# Patient Record
Sex: MALE | Race: WHITE | NOT HISPANIC OR LATINO | Employment: OTHER | URBAN - METROPOLITAN AREA
[De-identification: names, ages, dates, MRNs, and addresses within clinical notes are randomized per-mention and may not be internally consistent; named-entity substitution may affect disease eponyms.]

---

## 2017-03-22 ENCOUNTER — TRANSFERRED RECORDS (OUTPATIENT)
Dept: HEALTH INFORMATION MANAGEMENT | Facility: CLINIC | Age: 57
End: 2017-03-22

## 2017-11-02 ENCOUNTER — MEDICAL CORRESPONDENCE (OUTPATIENT)
Dept: HEALTH INFORMATION MANAGEMENT | Facility: CLINIC | Age: 57
End: 2017-11-02

## 2017-11-16 ENCOUNTER — OFFICE VISIT (OUTPATIENT)
Dept: NEUROSURGERY | Facility: CLINIC | Age: 57
End: 2017-11-16
Payer: COMMERCIAL

## 2017-11-16 VITALS
OXYGEN SATURATION: 95 % | DIASTOLIC BLOOD PRESSURE: 88 MMHG | SYSTOLIC BLOOD PRESSURE: 154 MMHG | BODY MASS INDEX: 38.94 KG/M2 | WEIGHT: 272 LBS | HEART RATE: 71 BPM | TEMPERATURE: 97.5 F | HEIGHT: 70 IN

## 2017-11-16 DIAGNOSIS — M54.12 CERVICAL RADICULOPATHY: Primary | ICD-10-CM

## 2017-11-16 PROCEDURE — 99204 OFFICE O/P NEW MOD 45 MIN: CPT | Performed by: NURSE PRACTITIONER

## 2017-11-16 RX ORDER — HYDROCODONE BITARTRATE AND ACETAMINOPHEN 7.5; 325 MG/1; MG/1
1 TABLET ORAL 3 TIMES DAILY PRN
Status: ON HOLD | COMMUNITY
End: 2017-12-06

## 2017-11-16 RX ORDER — INSULIN GLARGINE 100 [IU]/ML
50 INJECTION, SOLUTION SUBCUTANEOUS 2 TIMES DAILY
COMMUNITY

## 2017-11-16 RX ORDER — CITALOPRAM HYDROBROMIDE 20 MG/10ML
20 SOLUTION, ORAL ORAL DAILY
COMMUNITY

## 2017-11-16 NOTE — LETTER
"    11/16/2017         RE: Tony S Aschoff  [address]  [place] [address]        Dear Colleague,    Thank you for referring your patient, Tony S Aschoff, to the [facility]. Please see a copy of my visit note below.    Dr. Boo Butterfield  [facility]  Neurosurgery Clinic Visit        CC: Neck pain    Primary care Provider: Clinic, [facility]      Reason For Visit:   I was asked by Karen Worthington to consult on the patient for cervical spinal stenosis.      HPI: Tony S Aschoff is a 57 year old male with posterior neck pain for the past 1+ years.  He states the pain has progressed and radiates into the arms bilaterally, \"Sometimes it feels worse on the left.\"  He describes shooting pain into the biceps, sometimes along the forearm.  He has constant N/T in the hands and fingers, most prominent in the first and second digits bilaterally.  He states he has weakness that has progressed as well.  He has increased pain with turning his head and with reaching, lifting and bending.  He has some relief with current pain medication, \"But not a lot.\"  He has a long history of low back pain and has had previous injections in his low back.  He does not recall injections for his neck pain.  He states he sometimes has difficulty with balance.  He actually slipped on the ice this past weekend and has had increased pain to his side, \"I think I fractured my ribs.\"  He denies changes to bowel or bladder.      Pain right now: 7    History reviewed. No pertinent past medical history.    Past Medical History reviewed with patient during visit.    History reviewed. No pertinent surgical history.  Past Surgical History reviewed with patient during visit.    No current outpatient prescriptions on file.     No current facility-administered medications for this visit.        Not on File    Social History     Social History     Marital status: Single     Spouse name: N/A     Number of " "children: N/A     Years of education: N/A     Social History Main Topics     Smoking status: Never Smoker     Smokeless tobacco: Never Used     Alcohol use None     Drug use: None     Sexual activity: Not Asked     Other Topics Concern     None     Social History Narrative     None       History reviewed. No pertinent family history.      ROS: 10 point ROS neg other than the symptoms noted above in the HPI.    Vital Signs: /88 (BP Location: Right arm, Cuff Size: Adult Large)  Pulse 71  Temp 97.5  F (36.4  C) (Oral)  Ht 5' 10\" (1.778 m)  Wt 272 lb (123.4 kg)  SpO2 95%  BMI 39.03 kg/m2    Examination:  Constitutional:  Alert, well nourished, NAD.  Memory: recent and remote memory intact  HEENT: Normocephalic, atraumatic.   Pulm:  Without shortness of breath   CV:  No pitting edema of BLE.    Neurological:  Awake  Alert  Oriented x 3  Speech clear  Cranial nerves II - XII intact    Motor exam   Shoulder Abduction:  Right:  5/5   Left:  5/5  Biceps:                      Right:  5/5   Left:  4/5  Triceps:                     Right:  5/5   Left:  5/5  Wrist Extensors:       Right:  5/5   Left:  5/5  Wrist Flexors:           Right:  5/5   Left:  5/5  Intrinsics:                   Right:  5/5   Left:  5/5  Hip Flexor:                Right: 5/5  Left:  5/5  Hip Adductor:             Right:  5/5  Left:  5/5  Hip Abductor:             Right:  5/5  Left:  5/5  Gastroc Soleus:        Right:  5/5  Left:  5/5  Tib/Ant:                      Right:  5/5  Left:  5/5  EHL:                          Right:  5/5  Left:  5/5   Sensation normal to bilateral upper and lower extremities  Clonus negative  DTRs 2+symmetric    Gait: Able to stand from a seated position. Normal non-antalgic, non-myelopathic gait.  Able to heel/toe walk without loss of balance  Cervical examination reveals limited range of motion due to pain.  Tenderness to palpation of the cervical spine and paraspinous muscles bilaterally.    Lumbar examination " "reveals  tenderness of the spine and paraspinous muscles.   Negative SI joint, sciatic notch or greater trochanteric tenderness to palpation bilaterally.  Straight leg raise is negative bilaterally.      Imaging: Cervical MRI 10/9/17:  -Severe central stenosis at C5-6 with likely myelomalacia  -Severe neural foraminal stenosis bilaterally at C5-6    Assessment/Plan:   Cervical DDD  Cervical Radiculopathy        Tony S Aschoff is a 57 year old male with posterior neck pain for the past 1+ years.  He states the pain has progressed and radiates into the arms bilaterally, \"Sometimes it feels worse on the left.\"  He describes shooting pain into the biceps, sometimes along the forearm.  He has constant N/T in the hands and fingers, most prominent in the first and second digits bilaterally.  He states he has weakness that has progressed as well.  He has increased pain with turning his head and with reaching, lifting and bending.  He has some relief with current pain medication, \"But not a lot.\"  He has a long history of low back pain and has had previous injections in his low back.  He does not recall injections for his neck pain.  He states he sometimes has difficulty with balance.  He actually slipped on the ice this past weekend and has had increased pain to his side, \"I think I fractured my ribs.\"  He denies changes to bowel or bladder.  We reviewed his MRI results today and discussed returning to see Dr. Butterfield to discuss possible surgical options.  He is interested in surgery.  He agrees that if he has any increased weakness and/or severe pain or changes to gait he will contact us sooner.      Patient Instructions   Schedule return with Dr. Butterfield to discuss surgical option.  Please contact the clinic if pain persists at [phone].      Abeba Presley  [facility]  [address]  [address] [place] [id number]    Tel [phone]  Pager [phone]      Again, " thank you for allowing me to participate in the care of your patient.        Sincerely,        Abeba Hamilton, NP

## 2017-11-16 NOTE — NURSING NOTE
Tony S Aschoff is a 57 year old male who presents for:  Chief Complaint   Patient presents with     Neurologic Problem     referral from LISA Worthington NP for cervical spinal stenosis        Initial Vitals:  There were no vitals taken for this visit. There is no height or weight on file to calculate BMI.. There is no height or weight on file to calculate BSA. BP completed using cuff size: large  Data Unavailable    Do you feel safe in your environment?  Yes  Do you need any refills today? No    Nursing Comments: referral from LISA Worthington NP for cervical spinal stenosis   You rate your pain today as 7 goes into both arm and hands with tingling and burning      Rhina Gunter, CMA,AAS

## 2017-11-16 NOTE — MR AVS SNAPSHOT
"              After Visit Summary   2017    Tony S Aschoff    MRN: [id number]           Patient Information     Date Of Birth          1960        Visit Information        Provider Department      2017 8:40 AM Abeba Hamilton NP [facility]        Care Instructions    Schedule return with Dr. Butterfield to discuss surgical option.  Please contact the clinic if pain persists at [phone].            Follow-ups after your visit        Who to contact     If you have questions or need follow up information about today's clinic visit or your schedule please contact [facility] directly at [phone].  Normal or non-critical lab and imaging results will be communicated to you by MyChart, letter or phone within 4 business days after the clinic has received the results. If you do not hear from us within 7 days, please contact the clinic through MyChart or phone. If you have a critical or abnormal lab result, we will notify you by phone as soon as possible.  Submit refill requests through [employer] or call your pharmacy and they will forward the refill request to us. Please allow 3 business days for your refill to be completed.          Additional Information About Your Visit        MyChart Information     [employer] lets you send messages to your doctor, view your test results, renew your prescriptions, schedule appointments and more. To sign up, go to [email or web address] . Click on \"Log in\" on the left side of the screen, which will take you to the Welcome page. Then click on \"Sign up Now\" on the right side of the page.     You will be asked to enter the access code listed below, as well as some personal information. Please follow the directions to create your username and password.     Your access code is: [email or web address]  Expires: 2018  9:18 AM     Your access code will  in 90 days. If you need help or a new code, please call your [facility] or [phone].   " "     Care EveryWhere ID     This is your Care EveryWhere ID. This could be used by other organizations to access your [place] medical records  [id number]        Your Vitals Were     Pulse Temperature Height Pulse Oximetry BMI (Body Mass Index)       71 97.5  F (36.4  C) (Oral) 5' 10\" (1.778 m) 95% 39.03 kg/m2        Blood Pressure from Last 3 Encounters:   11/16/17 154/88    Weight from Last 3 Encounters:   11/16/17 272 lb (123.4 kg)              Today, you had the following     No orders found for display       Primary Care Provider Office Phone # Fax #    [facility] [phone] [address] [facility] [address]        Equal Access to Services     MICHELET TAYLOR : Hadii marcus alcantarao Sorivera, waaxda luqadaha, qaybta kaalmada adeegyada, mary jane idiin hayzari rogers . So [facility] [phone].    ATENCIÓN: Si habla español, tiene a fernández disposición servicios gratuitos de asistencia lingüística. Llame al [phone].    We comply with applicable federal civil rights laws and [place] laws. We do not discriminate on the basis of race, color, national origin, age, disability, sex, sexual orientation, or gender identity.            Thank you!     Thank you for choosing [facility]  for your care. Our goal is always to provide you with excellent care. Hearing back from our patients is one way we can continue to improve our services. Please take a few minutes to complete the written survey that you may receive in the mail after your visit with us. Thank you!             Your Updated Medication List - Protect others around you: Learn how to safely use, store and throw away your medicines at www.disposemymeds.org.      Notice  As of 11/16/2017  9:18 AM    You have not been prescribed any medications.      "

## 2017-11-16 NOTE — PROGRESS NOTES
"Dr. Boo Butterfield  [facility]  Neurosurgery Clinic Visit        CC: Neck pain    Primary care Provider: [facility]      Reason For Visit:   I was asked by Karen Worthington to consult on the patient for cervical spinal stenosis.      HPI: Tony S Aschoff is a 57 year old male with posterior neck pain for the past 1+ years.  He states the pain has progressed and radiates into the arms bilaterally, \"Sometimes it feels worse on the left.\"  He describes shooting pain into the biceps, sometimes along the forearm.  He has constant N/T in the hands and fingers, most prominent in the first and second digits bilaterally.  He states he has weakness that has progressed as well.  He has increased pain with turning his head and with reaching, lifting and bending.  He has some relief with current pain medication, \"But not a lot.\"  He has a long history of low back pain and has had previous injections in his low back.  He does not recall injections for his neck pain.  He states he sometimes has difficulty with balance.  He actually slipped on the ice this past weekend and has had increased pain to his side, \"I think I fractured my ribs.\"  He denies changes to bowel or bladder.      Pain right now: 7    History reviewed. No pertinent past medical history.    Past Medical History reviewed with patient during visit.    History reviewed. No pertinent surgical history.  Past Surgical History reviewed with patient during visit.    No current outpatient prescriptions on file.     No current facility-administered medications for this visit.        Not on File    Social History     Social History     Marital status: Single     Spouse name: N/A     Number of children: N/A     Years of education: N/A     Social History Main Topics     Smoking status: Never Smoker     Smokeless tobacco: Never Used     Alcohol use None     Drug use: None     Sexual activity: Not Asked     Other Topics Concern     None " "    Social History Narrative     None       History reviewed. No pertinent family history.      ROS: 10 point ROS neg other than the symptoms noted above in the HPI.    Vital Signs: /88 (BP Location: Right arm, Cuff Size: Adult Large)  Pulse 71  Temp 97.5  F (36.4  C) (Oral)  Ht 5' 10\" (1.778 m)  Wt 272 lb (123.4 kg)  SpO2 95%  BMI 39.03 kg/m2    Examination:  Constitutional:  Alert, well nourished, NAD.  Memory: recent and remote memory intact  HEENT: Normocephalic, atraumatic.   Pulm:  Without shortness of breath   CV:  No pitting edema of BLE.    Neurological:  Awake  Alert  Oriented x 3  Speech clear  Cranial nerves II - XII intact    Motor exam   Shoulder Abduction:  Right:  5/5   Left:  5/5  Biceps:                      Right:  5/5   Left:  4/5  Triceps:                     Right:  5/5   Left:  5/5  Wrist Extensors:       Right:  5/5   Left:  5/5  Wrist Flexors:           Right:  5/5   Left:  5/5  Intrinsics:                   Right:  5/5   Left:  5/5  Hip Flexor:                Right: 5/5  Left:  5/5  Hip Adductor:             Right:  5/5  Left:  5/5  Hip Abductor:             Right:  5/5  Left:  5/5  Gastroc Soleus:        Right:  5/5  Left:  5/5  Tib/Ant:                      Right:  5/5  Left:  5/5  EHL:                          Right:  5/5  Left:  5/5   Sensation normal to bilateral upper and lower extremities  Clonus negative  DTRs 2+symmetric    Gait: Able to stand from a seated position. Normal non-antalgic, non-myelopathic gait.  Able to heel/toe walk without loss of balance  Cervical examination reveals limited range of motion due to pain.  Tenderness to palpation of the cervical spine and paraspinous muscles bilaterally.    Lumbar examination reveals  tenderness of the spine and paraspinous muscles.   Negative SI joint, sciatic notch or greater trochanteric tenderness to palpation bilaterally.  Straight leg raise is negative bilaterally.      Imaging: Cervical MRI 10/9/17:  -Severe " "central stenosis at C5-6 with likely myelomalacia  -Severe neural foraminal stenosis bilaterally at C5-6    Assessment/Plan:   Cervical DDD  Cervical Radiculopathy        Tony S Aschoff is a 57 year old male with posterior neck pain for the past 1+ years.  He states the pain has progressed and radiates into the arms bilaterally, \"Sometimes it feels worse on the left.\"  He describes shooting pain into the biceps, sometimes along the forearm.  He has constant N/T in the hands and fingers, most prominent in the first and second digits bilaterally.  He states he has weakness that has progressed as well.  He has increased pain with turning his head and with reaching, lifting and bending.  He has some relief with current pain medication, \"But not a lot.\"  He has a long history of low back pain and has had previous injections in his low back.  He does not recall injections for his neck pain.  He states he sometimes has difficulty with balance.  He actually slipped on the ice this past weekend and has had increased pain to his side, \"I think I fractured my ribs.\"  He denies changes to bowel or bladder.  We reviewed his MRI results today and discussed returning to see Dr. Butterfield to discuss possible surgical options.  He is interested in surgery.  He agrees that if he has any increased weakness and/or severe pain or changes to gait he will contact us sooner.      Patient Instructions   Schedule return with Dr. Butterfield to discuss surgical option.  Please contact the clinic if pain persists at [phone].      Abeba Prelsey  [facility]  [address] [address]    Tel [phone]  Pager [phone]    "

## 2017-11-28 ENCOUNTER — OFFICE VISIT (OUTPATIENT)
Dept: NEUROSURGERY | Facility: CLINIC | Age: 57
End: 2017-11-28
Payer: COMMERCIAL

## 2017-11-28 VITALS
SYSTOLIC BLOOD PRESSURE: 141 MMHG | BODY MASS INDEX: 38.91 KG/M2 | HEART RATE: 76 BPM | DIASTOLIC BLOOD PRESSURE: 81 MMHG | RESPIRATION RATE: 16 BRPM | HEIGHT: 70 IN | TEMPERATURE: 97.1 F | WEIGHT: 271.8 LBS | OXYGEN SATURATION: 96 %

## 2017-11-28 DIAGNOSIS — M48.02 SPINAL STENOSIS IN CERVICAL REGION: Primary | ICD-10-CM

## 2017-11-28 DIAGNOSIS — M47.12 CERVICAL SPONDYLOSIS WITH MYELOPATHY: ICD-10-CM

## 2017-11-28 PROCEDURE — 99213 OFFICE O/P EST LOW 20 MIN: CPT | Performed by: NEUROLOGICAL SURGERY

## 2017-11-28 ASSESSMENT — PAIN SCALES - GENERAL: PAINLEVEL: EXTREME PAIN (8)

## 2017-11-28 NOTE — NURSING NOTE
"Tony S Aschoff is a 57 year old male who presents for:  Chief Complaint   Patient presents with     Neck Pain     Neck pain radiating into bilateral arm. Onset: about a year, but 2.5 weeks ago patient states he fell on a frozen step which caused worse pain in his shoulders and neck. He will have N/T in bilateral arm. Left arm is worse due to his fall. He is here to discuss surgery with Dr. Butterfield.         Initial Vitals:  /81  Pulse 76  Temp 97.1  F (36.2  C) (Oral)  Resp 16  Ht 5' 10\" (1.778 m)  Wt 271 lb 12.8 oz (123.3 kg)  SpO2 96%  BMI 39 kg/m2 Estimated body mass index is 39 kg/(m^2) as calculated from the following:    Height as of this encounter: 5' 10\" (1.778 m).    Weight as of this encounter: 271 lb 12.8 oz (123.3 kg).. Body surface area is 2.47 meters squared. BP completed using cuff size: large  Extreme Pain (8)    Do you feel safe in your environment?  Yes  Do you need any refills today? No    Nursing Comments: Neck pain radiating into bilateral arm. Onset: about a year, but 2.5 weeks ago patient states he fell on a frozen step which caused worse pain in his shoulders and neck. He will have N/T in bilateral arm. Left arm is worse due to his fall. He is here to discuss surgery with Dr. Butterfield.         Ashley Stuart    "

## 2017-11-28 NOTE — MR AVS SNAPSHOT
After Visit Summary   11/28/2017    Tony S Aschoff    MRN: [id number]           Patient Information     Date Of Birth          1960        Visit Information        Provider Department      11/28/2017 1:40 PM Boo Butterfield MD [facility] Instructions    Patient Instructions  Pre-Operative:  -Surgery scheduled at [facility] for C5-6 ACDF (anterior cervical discectomy and fusion)  -Surgical risks: blood clots in the leg or lung, problems urinating, nerve damage, drainage from the incision, infection,stiffness  - Pre-operative physical with primary care physician within 30 days of surgical date.   -1 night hospitalization.    -Shower procedure: please shower with antimicrobial soap the night before surgery and morning of surgery. Please refer to showering instruction sheet in folder.  -Stop all solid foods 8 hours before surgery.  -Keep drinking clear liquids until 4 hours before surgery. Clear liquids include water, clear juice, black coffee, or clear tea without milk, Gatorade, clear soda.   - Discontinue Aspirin, NSAIDs (Advil/Ibuprofen, Naproxen,Nuprin,Relafen/Nabumetone, Diclofenac,Meloxicam, Aleve, Celebrex) x 7 days prior to surgical date.  - May try tylenol for pain 1000 mg three times per day for pain      Post-Operative:  -Do not begin taking Non-Steroidal Anti-Inflammatory Drugs or NSAIDs (Advil/ibuprofen, Naproxen,Nuprin, Relafen/Nabumetone, Diclofenac,Meloxicam, Aleve, Celebrex, Aspirin, etc.) until 12 weeks after surgery. May cause bleeding and interfere with bone healing.   - Post operative incisional pain x 1-2 weeks which will require pain medications and muscle relaxants. You will receive medication upon discharge.  -Do NOT drive while taking narcotic pain medication.  -Do not drink alcohol while using any pain medication   -Post operative incision care- Watch for signs of infection: redness, swelling, warmth, drainage, and fever of  "101 degrees or higher. Notify clinic [phone].  -No submerging incision in water such as pools, hot tubs,baths for at least 8 weeks or until incision is healed. Showers are fine.   - Post operative activity limitations: 6-8 weeks, no lifting > 10 pounds, no bending, twisting, or overhead reaching.  -Orthotics will fit you for a brace in the hospital.Cervical fusion: wear soft collar for up to 2 weeks as needed for comfort.  - Follow up appointments: 6 week post op follow up visit with Nurse practitioner and x-ray prior to appointment.Please call to schedule follow up appointment at [phone].  -If you are currently employed, you will need to be off work for 4-6 weeks for post op recovery and healing.                                 Follow-ups after your visit        Who to contact     If you have questions or need follow up information about today's clinic visit or your schedule please contact [facility] directly at [phone].  Normal or non-critical lab and imaging results will be communicated to you by Tynthart, letter or phone within 4 business days after the clinic has received the results. If you do not hear from us within 7 days, please contact the clinic through Frequent Browsert or phone. If you have a critical or abnormal lab result, we will notify you by phone as soon as possible.  Submit refill requests through Risktail or call your pharmacy and they will forward the refill request to us. Please allow 3 business days for your refill to be completed.          Additional Information About Your Visit        Risktail Information     Risktail lets you send messages to your doctor, view your test results, renew your prescriptions, schedule appointments and more. To sign up, go to [email or web address] . Click on \"Log in\" on the left side of the screen, which will take you to the Welcome page. Then click on \"Sign up Now\" on the right side of the page.     You will be asked to enter the access code " "listed below, as well as some personal information. Please follow the directions to create your username and password.     Your access code is: [email or web address]  Expires: 2018  9:18 AM     Your access code will  in 90 days. If you need help or a new code, please call your [facility] clinic or [phone].        Care EveryWhere ID     This is your Care EveryWhere ID. This could be used by other organizations to access your [facility] medical records  [id number]        Your Vitals Were     Pulse Temperature Respirations Height Pulse Oximetry BMI (Body Mass Index)    76 97.1  F (36.2  C) (Oral) 16 5' 10\" (1.778 m) 96% 39 kg/m2       Blood Pressure from Last 3 Encounters:   17 141/81   17 154/88    Weight from Last 3 Encounters:   17 271 lb 12.8 oz (123.3 kg)   17 272 lb (123.4 kg)              Today, you had the following     No orders found for display       Primary Care Provider Office Phone # Fax #    [facility] [phone]       [address] [facility] [address]        Equal Access to Services     MICHELET TAYLOR : Hadii marcus ku hadasho Soomaali, waaxda luqadaha, qaybta kaalmada adeegyada, waxay jovan hayzari smith. So [facility] [phone].    ATENCIÓN: Si habla español, tiene a fernández disposición servicios gratuitos de asistencia lingüística. Llame al [phone].    We comply with applicable federal civil rights laws and [place] laws. We do not discriminate on the basis of race, color, national origin, age, disability, sex, sexual orientation, or gender identity.            Thank you!     Thank you for choosing [facility]  for your care. Our goal is always to provide you with excellent care. Hearing back from our patients is one way we can continue to improve our services. Please take a few minutes to complete the written survey that you may receive in the mail after your visit with us. Thank you!             Your " Updated Medication List - Protect others around you: Learn how to safely use, store and throw away your medicines at www.disposemymeds.org.          This list is accurate as of: 11/28/17  2:40 PM.  Always use your most recent med list.                   Brand Name Dispense Instructions for use Diagnosis    ATORVASTATIN CALCIUM PO      Take 80 mg by mouth        BACLOFEN PO      Take 10 mg by mouth 3 times daily        BASAGLAR 100 UNIT/ML injection      Inject 50 Units Subcutaneous 2 times daily        citalopram 10 MG/5ML solution    celeXA     Take 20 mg by mouth daily        dulaglutide 1.5 MG/0.5ML pen    TRULICITY     Inject 1.5 mg Subcutaneous        HYDROcodone-acetaminophen 7.5-325 MG per tablet    NORCO     Take 1 tablet by mouth 3 times daily as needed for moderate to severe pain        LISINOPRIL PO      Take 10 mg by mouth daily        METFORMIN HCL PO      Take 500 mg by mouth 2 times daily (with meals) Take 2 tablets twice daily        NovoLOG FLEXPEN 100 UNIT/ML injection   Generic drug:  insulin aspart      Inject 30 Units Subcutaneous 3 times daily (with meals)

## 2017-11-28 NOTE — PATIENT INSTRUCTIONS
Patient Instructions  Pre-Operative:  -Surgery scheduled at [facility] for C5-6 ACDF (anterior cervical discectomy and fusion)  -Surgical risks: blood clots in the leg or lung, problems urinating, nerve damage, drainage from the incision, infection,stiffness  - Pre-operative physical with primary care physician within 30 days of surgical date.   -1 night hospitalization.    -Shower procedure: please shower with antimicrobial soap the night before surgery and morning of surgery. Please refer to showering instruction sheet in folder.  -Stop all solid foods 8 hours before surgery.  -Keep drinking clear liquids until 4 hours before surgery. Clear liquids include water, clear juice, black coffee, or clear tea without milk, Gatorade, clear soda.   - Discontinue Aspirin, NSAIDs (Advil/Ibuprofen, Naproxen,Nuprin,Relafen/Nabumetone, Diclofenac,Meloxicam, Aleve, Celebrex) x 7 days prior to surgical date.  - May try tylenol for pain 1000 mg three times per day for pain      Post-Operative:  -Do not begin taking Non-Steroidal Anti-Inflammatory Drugs or NSAIDs (Advil/ibuprofen, Naproxen,Nuprin, Relafen/Nabumetone, Diclofenac,Meloxicam, Aleve, Celebrex, Aspirin, etc.) until 12 weeks after surgery. May cause bleeding and interfere with bone healing.   - Post operative incisional pain x 1-2 weeks which will require pain medications and muscle relaxants. You will receive medication upon discharge.  -Do NOT drive while taking narcotic pain medication.  -Do not drink alcohol while using any pain medication   -Post operative incision care- Watch for signs of infection: redness, swelling, warmth, drainage, and fever of 101 degrees or higher. Notify clinic [phone].  -No submerging incision in water such as pools, hot tubs,baths for at least 8 weeks or until incision is healed. Showers are fine.   - Post operative activity limitations: 6-8 weeks, no lifting > 10 pounds, no bending, twisting, or overhead  reaching.  -Orthotics will fit you for a brace in the hospital.Cervical fusion: wear soft collar for up to 2 weeks as needed for comfort.  - Follow up appointments: 6 week post op follow up visit with Nurse practitioner and x-ray prior to appointment.Please call to schedule follow up appointment at [phone].  -If you are currently employed, you will need to be off work for 4-6 weeks for post op recovery and healing.

## 2017-11-28 NOTE — LETTER
11/28/2017         RE: Tony S Aschoff  [address]  [place] [address]        Dear Colleague,    Thank you for referring your patient, Tony S Aschoff, to the [facility]. Please see a copy of my visit note below.    Neurosurgery Follow Up Clinic Visit      Tony S Aschoff returns for follow up visit regarding his BUE numbness and tingling and clumsy legs    Currently the patient describes having persistent and worsening BUE numbness and tingling and clumsy legs. He has normal bowel and bladder function. He feels that his hands are getting worse and his legs are more clumsy. He recently fell due to these symptoms.    Exam is stable    MRI cervical spine - C5-6 severe stenosis with spinal cord compression and myelomalacia     Assessment: 57 year old male with C5-6 severe stenosis with myelomalacia and myelopathy       Plan:   I have recommended an urgent C5-6 ACDF. I discussed with the patient the risk of surgery to include, but, not be limited to; dysphagia, hoarseness, paralyzed vocal cord, nerve/spinal cord injury, pseudoarthrosis, failure of hardware, failure of improvement of symptoms, CSF leak,  infection, post op hematoma, the need for recurrent surgery, paralysis, coma and death.            Again, thank you for allowing me to participate in the care of your patient.        Sincerely,        Boo Butterfield MD

## 2017-11-29 NOTE — NURSING NOTE
Pre-operative Education    Education included but not limited to:  - Pre-operative physical with primary care physician within 30 days of surgical date. Pre op [place], [facility].   - Pre-operative clearance (cardiology, hematology, etc).   - Discontinue Aspirin, NSAIDs, Diclofenac x 7 days prior to surgical date.   -May try tylenol for pain 1000 mg three times per day for pain  -Do not begin taking Non-Steroidal Anti-Inflammatory Drugs or NSAIDs (Advil,Motrin, Ibuprofen,Nuprin, Diclofenac,Meloxicam, Aleve, Celebrex, Aspirin, etc.) until 12 weeks after surgery if you had a fusion. May cause bleeding and interfere with bone healing.  -Patient is not a smoker  -Forms to be completed: 6 weeks fmla if needed   -Surgical risks: blood clots in the leg or lung, problems urinating, nerve damage, drainage from the incision, infection,stiffness  -Preparation timeline  - When to start NPO           -Special bathing procedure.Patient received Hibiclens and showering instruction sheet.   Hospital stay:  Checking in  The surgery itself   Recovery room  - Transition to the hospital room where you will begin your recovery  - Managing pain   -  1 night hospitalization  - Post operative incisional pain x 1-2 weeks which will require pain medications and muscle relaxants.   -Do NOT drive while taking narcotic pain medication.  -Post operative incision care-Keep your incision clean and dry at all times. OK to remove dressing on postop day 2. OK to shower on postop day 3 and allow water to run over incision, pat dry after shower. No bathing, swimming or submerging in water. Call immediately or come to ED if any drainage occurs, or you develop new pain. Watch for signs of infection: redness, swelling, warmth, drainage, and fever of 101 degrees or higher. Notify clinic.   - Post operative activity limitations: 6-8 weeks, no lifting > 10 pounds, no bending, twisting, or overhead reaching.  -Orthotics will fit you for a brace in  the hospital.Cervical fusion: wear soft collar for up to 2 weeks as needed for comfort.  - Follow up appointments in 6 weeks with Nurse Practitioner with X-ray prior. Please call to schedule follow up appointment at [phone].   - Spine Education book was also given to the patient for further review.      Patient verbalized understanding of above instructions. All questions were answered to the best of my ability and the patient's satisfaction. Patient advised to call with any additional questions or concerns.  Diana Hobbs RN

## 2017-11-29 NOTE — PROGRESS NOTES
ROGE     D: Call was received from Jesusita in pre-admitting noting of pt's upcoming surgery as the concerns that he shared regarding finances and food.      I: SW spoke by phone with pt who informs that he has been struggling to make ends meet, and has had difficulties making some of his expenses noting that he is two months behind on his car payments. He notes that he does use the food shelves in his area, had food stamps in the past but not currently. SW suggested that he make application to the SNAP (food stamp) program, he indicated that he was unsure if he could get through that process. SW noticed that he has a brother, living in the area, inquired if he could help with the on line application for the SNAP program.. he felt that his brother was too busy, but would check with him. During the course of conversation SW discussed with him the opportunity for case management/care coordination for him for navagating the available systems, as well as monitoring his needs and health issues which pt indicated would be beneficial. SW has left message with primary care RN clinic coordinator,  Marielena Tong ( [phone]) regarding requesting support from her or SW in the clinic if available for pt.         With continued discussion with pt regarding Marielena's availability pt informed SW that he knows Marielena and saw her in follow-up at clinic to work on getting his A1C down. It was suggested that he contact Marielena as soon as possible for the support and clinic management as he noted that he did not share with Marielena the extent of the financial struggles he is experiencing which would be crucial as they together look at pt's dietary needs, access to appropriate foods, etc. SW also has spoken to pt about the opportunities that the community he lives in for the free meal services, he was not interested in pursuing these at this time.     A/P: SW available post surgery for discharge planning as needed.

## 2017-11-29 NOTE — OR NURSING
ML for social work dept re: pt stated during pre op call that he has worries about his financial situation during 6 week recovery as he will not have an income so no access to money or food.

## 2017-11-29 NOTE — PROGRESS NOTES
Neurosurgery Follow Up Clinic Visit      Tony S Aschoff returns for follow up visit regarding his BUE numbness and tingling and clumsy legs    Currently the patient describes having persistent and worsening BUE numbness and tingling and clumsy legs. He has normal bowel and bladder function. He feels that his hands are getting worse and his legs are more clumsy. He recently fell due to these symptoms.    Exam is stable    MRI cervical spine - C5-6 severe stenosis with spinal cord compression and myelomalacia     Assessment: 57 year old male with C5-6 severe stenosis with myelomalacia and myelopathy       Plan:   I have recommended an urgent C5-6 ACDF. I discussed with the patient the risk of surgery to include, but, not be limited to; dysphagia, hoarseness, paralyzed vocal cord, nerve/spinal cord injury, pseudoarthrosis, failure of hardware, failure of improvement of symptoms, CSF leak,  infection, post op hematoma, the need for recurrent surgery, paralysis, coma and death.

## 2017-12-05 ENCOUNTER — ANESTHESIA (OUTPATIENT)
Dept: SURGERY | Facility: CLINIC | Age: 57
DRG: 472 | End: 2017-12-05
Payer: COMMERCIAL

## 2017-12-05 ENCOUNTER — APPOINTMENT (OUTPATIENT)
Dept: GENERAL RADIOLOGY | Facility: CLINIC | Age: 57
DRG: 472 | End: 2017-12-05
Attending: NEUROLOGICAL SURGERY
Payer: COMMERCIAL

## 2017-12-05 ENCOUNTER — ANESTHESIA EVENT (OUTPATIENT)
Dept: SURGERY | Facility: CLINIC | Age: 57
DRG: 472 | End: 2017-12-05
Payer: COMMERCIAL

## 2017-12-05 ENCOUNTER — HOSPITAL ENCOUNTER (INPATIENT)
Facility: CLINIC | Age: 57
LOS: 1 days | Discharge: HOME | DRG: 472 | End: 2017-12-06
Attending: NEUROLOGICAL SURGERY | Admitting: NEUROLOGICAL SURGERY
Payer: COMMERCIAL

## 2017-12-05 DIAGNOSIS — Z98.1 S/P CERVICAL SPINAL FUSION: Primary | ICD-10-CM

## 2017-12-05 LAB
ABO + RH BLD: NORMAL
ABO + RH BLD: NORMAL
BLD GP AB SCN SERPL QL: NORMAL
BLOOD BANK CMNT PATIENT-IMP: NORMAL
GLUCOSE BLDC GLUCOMTR-MCNC: 164 MG/DL (ref 70–99)
GLUCOSE BLDC GLUCOMTR-MCNC: 172 MG/DL (ref 70–99)
GLUCOSE BLDC GLUCOMTR-MCNC: 211 MG/DL (ref 70–99)
GLUCOSE BLDC GLUCOMTR-MCNC: 232 MG/DL (ref 70–99)
GLUCOSE BLDC GLUCOMTR-MCNC: 258 MG/DL (ref 70–99)
HGB BLD-MCNC: 15.7 G/DL (ref 13.3–17.7)
SPECIMEN EXP DATE BLD: NORMAL

## 2017-12-05 PROCEDURE — 27210794 ZZH OR GENERAL SUPPLY STERILE: Performed by: NEUROLOGICAL SURGERY

## 2017-12-05 PROCEDURE — 25000132 ZZH RX MED GY IP 250 OP 250 PS 637: Performed by: NEUROLOGICAL SURGERY

## 2017-12-05 PROCEDURE — 71000013 ZZH RECOVERY PHASE 1 LEVEL 1 EA ADDTL HR: Performed by: NEUROLOGICAL SURGERY

## 2017-12-05 PROCEDURE — 25800025 ZZH RX 258: Performed by: NEUROLOGICAL SURGERY

## 2017-12-05 PROCEDURE — 25000566 ZZH SEVOFLURANE, EA 15 MIN: Performed by: NEUROLOGICAL SURGERY

## 2017-12-05 PROCEDURE — 40000277 XR SURGERY CARM FLUORO LESS THAN 5 MIN W STILLS: Mod: TC

## 2017-12-05 PROCEDURE — 36000071 ZZH SURGERY LEVEL 5 W FLUORO 1ST 30 MIN: Performed by: NEUROLOGICAL SURGERY

## 2017-12-05 PROCEDURE — 00000146 ZZHCL STATISTIC GLUCOSE BY METER IP

## 2017-12-05 PROCEDURE — 25000128 H RX IP 250 OP 636: Performed by: NEUROLOGICAL SURGERY

## 2017-12-05 PROCEDURE — 86900 BLOOD TYPING SEROLOGIC ABO: CPT | Performed by: ANESTHESIOLOGY

## 2017-12-05 PROCEDURE — 25000128 H RX IP 250 OP 636: Performed by: NURSE ANESTHETIST, CERTIFIED REGISTERED

## 2017-12-05 PROCEDURE — 40000306 ZZH STATISTIC PRE PROC ASSESS II: Performed by: NEUROLOGICAL SURGERY

## 2017-12-05 PROCEDURE — 95940 IONM IN OPERATNG ROOM 15 MIN: CPT | Mod: XU | Performed by: NEUROLOGICAL SURGERY

## 2017-12-05 PROCEDURE — C1713 ANCHOR/SCREW BN/BN,TIS/BN: HCPCS | Performed by: NEUROLOGICAL SURGERY

## 2017-12-05 PROCEDURE — 00NW0ZZ RELEASE CERVICAL SPINAL CORD, OPEN APPROACH: ICD-10-PCS | Performed by: NEUROLOGICAL SURGERY

## 2017-12-05 PROCEDURE — 37000009 ZZH ANESTHESIA TECHNICAL FEE, EACH ADDTL 15 MIN: Performed by: NEUROLOGICAL SURGERY

## 2017-12-05 PROCEDURE — 22853 INSJ BIOMECHANICAL DEVICE: CPT | Performed by: NEUROLOGICAL SURGERY

## 2017-12-05 PROCEDURE — 86850 RBC ANTIBODY SCREEN: CPT | Performed by: ANESTHESIOLOGY

## 2017-12-05 PROCEDURE — 36000069 ZZH SURGERY LEVEL 5 EA 15 ADDTL MIN: Performed by: NEUROLOGICAL SURGERY

## 2017-12-05 PROCEDURE — 25000125 ZZHC RX 250: Performed by: NURSE ANESTHETIST, CERTIFIED REGISTERED

## 2017-12-05 PROCEDURE — 25000128 H RX IP 250 OP 636: Performed by: ANESTHESIOLOGY

## 2017-12-05 PROCEDURE — 0RG10A0 FUSION OF CERVICAL VERTEBRAL JOINT WITH INTERBODY FUSION DEVICE, ANTERIOR APPROACH, ANTERIOR COLUMN, OPEN APPROACH: ICD-10-PCS | Performed by: NEUROLOGICAL SURGERY

## 2017-12-05 PROCEDURE — 86901 BLOOD TYPING SEROLOGIC RH(D): CPT | Performed by: ANESTHESIOLOGY

## 2017-12-05 PROCEDURE — 85018 HEMOGLOBIN: CPT | Performed by: ANESTHESIOLOGY

## 2017-12-05 PROCEDURE — 25000132 ZZH RX MED GY IP 250 OP 250 PS 637: Performed by: INTERNAL MEDICINE

## 2017-12-05 PROCEDURE — 4A11X4G MONITORING OF PERIPHERAL NERVOUS ELECTRICAL ACTIVITY, INTRAOPERATIVE, EXTERNAL APPROACH: ICD-10-PCS | Performed by: NEUROLOGICAL SURGERY

## 2017-12-05 PROCEDURE — 25000300 ZZH OR RX SURGIFLO HEMOSTATIC MATRIX 10ML 199102S OPNP: Performed by: NEUROLOGICAL SURGERY

## 2017-12-05 PROCEDURE — 01N10ZZ RELEASE CERVICAL NERVE, OPEN APPROACH: ICD-10-PCS | Performed by: NEUROLOGICAL SURGERY

## 2017-12-05 PROCEDURE — 93010 ELECTROCARDIOGRAM REPORT: CPT | Performed by: INTERNAL MEDICINE

## 2017-12-05 PROCEDURE — 0RB30ZZ EXCISION OF CERVICAL VERTEBRAL DISC, OPEN APPROACH: ICD-10-PCS | Performed by: NEUROLOGICAL SURGERY

## 2017-12-05 PROCEDURE — 27210995 ZZH RX 272: Performed by: NEUROLOGICAL SURGERY

## 2017-12-05 PROCEDURE — 36415 COLL VENOUS BLD VENIPUNCTURE: CPT | Performed by: ANESTHESIOLOGY

## 2017-12-05 PROCEDURE — 99207 ZZC CONSULT E&M CHANGED TO INITIAL LEVEL: CPT | Performed by: INTERNAL MEDICINE

## 2017-12-05 PROCEDURE — 22551 ARTHRD ANT NTRBDY CERVICAL: CPT | Performed by: NEUROLOGICAL SURGERY

## 2017-12-05 PROCEDURE — 71000012 ZZH RECOVERY PHASE 1 LEVEL 1 FIRST HR: Performed by: NEUROLOGICAL SURGERY

## 2017-12-05 PROCEDURE — 92200047 ZZHC NEURO MONITORING SERVICE, UP TO 7 HOURS (T1FEE): Performed by: NEUROLOGICAL SURGERY

## 2017-12-05 PROCEDURE — 99222 1ST HOSP IP/OBS MODERATE 55: CPT | Performed by: INTERNAL MEDICINE

## 2017-12-05 PROCEDURE — 12000007 ZZH R&B INTERMEDIATE

## 2017-12-05 PROCEDURE — 25000131 ZZH RX MED GY IP 250 OP 636 PS 637: Performed by: INTERNAL MEDICINE

## 2017-12-05 PROCEDURE — 27810325 ZZHC OR IMPLANT OTHER OPNP: Performed by: NEUROLOGICAL SURGERY

## 2017-12-05 PROCEDURE — 22842 INSERT SPINE FIXATION DEVICE: CPT | Mod: 59 | Performed by: NEUROLOGICAL SURGERY

## 2017-12-05 PROCEDURE — 37000008 ZZH ANESTHESIA TECHNICAL FEE, 1ST 30 MIN: Performed by: NEUROLOGICAL SURGERY

## 2017-12-05 PROCEDURE — 25000125 ZZHC RX 250: Performed by: NEUROLOGICAL SURGERY

## 2017-12-05 DEVICE — IMPLANTABLE DEVICE: Type: IMPLANTABLE DEVICE | Site: SPINE CERVICAL | Status: FUNCTIONAL

## 2017-12-05 RX ORDER — ACETAMINOPHEN 325 MG/1
650 TABLET ORAL EVERY 4 HOURS PRN
Status: DISCONTINUED | OUTPATIENT
Start: 2017-12-08 | End: 2017-12-06 | Stop reason: HOSPADM

## 2017-12-05 RX ORDER — LIDOCAINE 40 MG/G
CREAM TOPICAL
Status: DISCONTINUED | OUTPATIENT
Start: 2017-12-05 | End: 2017-12-05 | Stop reason: HOSPADM

## 2017-12-05 RX ORDER — DIMENHYDRINATE 50 MG/ML
25 INJECTION, SOLUTION INTRAMUSCULAR; INTRAVENOUS
Status: DISCONTINUED | OUTPATIENT
Start: 2017-12-05 | End: 2017-12-05 | Stop reason: HOSPADM

## 2017-12-05 RX ORDER — ACETAMINOPHEN 325 MG/1
975 TABLET ORAL EVERY 8 HOURS
Status: DISCONTINUED | OUTPATIENT
Start: 2017-12-05 | End: 2017-12-05

## 2017-12-05 RX ORDER — ONDANSETRON 2 MG/ML
INJECTION INTRAMUSCULAR; INTRAVENOUS PRN
Status: DISCONTINUED | OUTPATIENT
Start: 2017-12-05 | End: 2017-12-05

## 2017-12-05 RX ORDER — ONDANSETRON 4 MG/1
4 TABLET, ORALLY DISINTEGRATING ORAL EVERY 30 MIN PRN
Status: DISCONTINUED | OUTPATIENT
Start: 2017-12-05 | End: 2017-12-05 | Stop reason: HOSPADM

## 2017-12-05 RX ORDER — ACETAMINOPHEN 325 MG/1
650 TABLET ORAL EVERY 4 HOURS PRN
Status: DISCONTINUED | OUTPATIENT
Start: 2017-12-08 | End: 2017-12-05

## 2017-12-05 RX ORDER — LABETALOL HYDROCHLORIDE 5 MG/ML
10 INJECTION, SOLUTION INTRAVENOUS
Status: DISCONTINUED | OUTPATIENT
Start: 2017-12-05 | End: 2017-12-05 | Stop reason: HOSPADM

## 2017-12-05 RX ORDER — ACETAMINOPHEN 325 MG/1
975 TABLET ORAL EVERY 8 HOURS
Status: DISCONTINUED | OUTPATIENT
Start: 2017-12-05 | End: 2017-12-06 | Stop reason: HOSPADM

## 2017-12-05 RX ORDER — CITALOPRAM HYDROBROMIDE 10 MG/1
20 TABLET ORAL DAILY
Status: DISCONTINUED | OUTPATIENT
Start: 2017-12-05 | End: 2017-12-06 | Stop reason: HOSPADM

## 2017-12-05 RX ORDER — NICOTINE POLACRILEX 4 MG
15-30 LOZENGE BUCCAL
Status: DISCONTINUED | OUTPATIENT
Start: 2017-12-05 | End: 2017-12-06 | Stop reason: HOSPADM

## 2017-12-05 RX ORDER — HYDRALAZINE HYDROCHLORIDE 20 MG/ML
2.5-5 INJECTION INTRAMUSCULAR; INTRAVENOUS EVERY 10 MIN PRN
Status: DISCONTINUED | OUTPATIENT
Start: 2017-12-05 | End: 2017-12-05 | Stop reason: HOSPADM

## 2017-12-05 RX ORDER — MEPERIDINE HYDROCHLORIDE 25 MG/ML
12.5 INJECTION INTRAMUSCULAR; INTRAVENOUS; SUBCUTANEOUS EVERY 5 MIN PRN
Status: DISCONTINUED | OUTPATIENT
Start: 2017-12-05 | End: 2017-12-05 | Stop reason: HOSPADM

## 2017-12-05 RX ORDER — LIDOCAINE 40 MG/G
CREAM TOPICAL
Status: DISCONTINUED | OUTPATIENT
Start: 2017-12-05 | End: 2017-12-06 | Stop reason: HOSPADM

## 2017-12-05 RX ORDER — METOCLOPRAMIDE 10 MG/1
10 TABLET ORAL EVERY 6 HOURS PRN
Status: DISCONTINUED | OUTPATIENT
Start: 2017-12-05 | End: 2017-12-06 | Stop reason: HOSPADM

## 2017-12-05 RX ORDER — SODIUM CHLORIDE, SODIUM LACTATE, POTASSIUM CHLORIDE, CALCIUM CHLORIDE 600; 310; 30; 20 MG/100ML; MG/100ML; MG/100ML; MG/100ML
INJECTION, SOLUTION INTRAVENOUS CONTINUOUS
Status: DISCONTINUED | OUTPATIENT
Start: 2017-12-05 | End: 2017-12-05 | Stop reason: HOSPADM

## 2017-12-05 RX ORDER — LIDOCAINE HYDROCHLORIDE 10 MG/ML
INJECTION, SOLUTION INFILTRATION; PERINEURAL PRN
Status: DISCONTINUED | OUTPATIENT
Start: 2017-12-05 | End: 2017-12-05

## 2017-12-05 RX ORDER — HYDROMORPHONE HYDROCHLORIDE 1 MG/ML
.3-.5 INJECTION, SOLUTION INTRAMUSCULAR; INTRAVENOUS; SUBCUTANEOUS EVERY 5 MIN PRN
Status: DISCONTINUED | OUTPATIENT
Start: 2017-12-05 | End: 2017-12-05 | Stop reason: HOSPADM

## 2017-12-05 RX ORDER — FENTANYL CITRATE 50 UG/ML
INJECTION, SOLUTION INTRAMUSCULAR; INTRAVENOUS PRN
Status: DISCONTINUED | OUTPATIENT
Start: 2017-12-05 | End: 2017-12-05

## 2017-12-05 RX ORDER — NEOSTIGMINE METHYLSULFATE 1 MG/ML
VIAL (ML) INJECTION PRN
Status: DISCONTINUED | OUTPATIENT
Start: 2017-12-05 | End: 2017-12-05

## 2017-12-05 RX ORDER — NALOXONE HYDROCHLORIDE 0.4 MG/ML
.1-.4 INJECTION, SOLUTION INTRAMUSCULAR; INTRAVENOUS; SUBCUTANEOUS
Status: DISCONTINUED | OUTPATIENT
Start: 2017-12-05 | End: 2017-12-06 | Stop reason: HOSPADM

## 2017-12-05 RX ORDER — BACLOFEN 10 MG/1
10 TABLET ORAL 3 TIMES DAILY
Status: DISCONTINUED | OUTPATIENT
Start: 2017-12-05 | End: 2017-12-06 | Stop reason: HOSPADM

## 2017-12-05 RX ORDER — EPHEDRINE SULFATE 50 MG/ML
INJECTION, SOLUTION INTRAMUSCULAR; INTRAVENOUS; SUBCUTANEOUS PRN
Status: DISCONTINUED | OUTPATIENT
Start: 2017-12-05 | End: 2017-12-05

## 2017-12-05 RX ORDER — PROPOFOL 10 MG/ML
INJECTION, EMULSION INTRAVENOUS CONTINUOUS PRN
Status: DISCONTINUED | OUTPATIENT
Start: 2017-12-05 | End: 2017-12-05

## 2017-12-05 RX ORDER — ONDANSETRON 2 MG/ML
4 INJECTION INTRAMUSCULAR; INTRAVENOUS EVERY 30 MIN PRN
Status: DISCONTINUED | OUTPATIENT
Start: 2017-12-05 | End: 2017-12-05 | Stop reason: HOSPADM

## 2017-12-05 RX ORDER — PROCHLORPERAZINE MALEATE 10 MG
10 TABLET ORAL EVERY 6 HOURS PRN
Status: DISCONTINUED | OUTPATIENT
Start: 2017-12-05 | End: 2017-12-06 | Stop reason: HOSPADM

## 2017-12-05 RX ORDER — LISINOPRIL 10 MG/1
10 TABLET ORAL DAILY
Status: DISCONTINUED | OUTPATIENT
Start: 2017-12-05 | End: 2017-12-06 | Stop reason: HOSPADM

## 2017-12-05 RX ORDER — BUPIVACAINE HYDROCHLORIDE AND EPINEPHRINE 5; 5 MG/ML; UG/ML
INJECTION, SOLUTION PERINEURAL PRN
Status: DISCONTINUED | OUTPATIENT
Start: 2017-12-05 | End: 2017-12-05 | Stop reason: HOSPADM

## 2017-12-05 RX ORDER — CEFAZOLIN SODIUM 1 G/3ML
1 INJECTION, POWDER, FOR SOLUTION INTRAMUSCULAR; INTRAVENOUS SEE ADMIN INSTRUCTIONS
Status: DISCONTINUED | OUTPATIENT
Start: 2017-12-05 | End: 2017-12-05 | Stop reason: HOSPADM

## 2017-12-05 RX ORDER — ONDANSETRON 2 MG/ML
4 INJECTION INTRAMUSCULAR; INTRAVENOUS EVERY 6 HOURS PRN
Status: DISCONTINUED | OUTPATIENT
Start: 2017-12-05 | End: 2017-12-06 | Stop reason: HOSPADM

## 2017-12-05 RX ORDER — DIPHENHYDRAMINE HCL 25 MG
25 CAPSULE ORAL EVERY 6 HOURS PRN
Status: DISCONTINUED | OUTPATIENT
Start: 2017-12-05 | End: 2017-12-06 | Stop reason: HOSPADM

## 2017-12-05 RX ORDER — METOCLOPRAMIDE HYDROCHLORIDE 5 MG/ML
10 INJECTION INTRAMUSCULAR; INTRAVENOUS EVERY 6 HOURS PRN
Status: DISCONTINUED | OUTPATIENT
Start: 2017-12-05 | End: 2017-12-06 | Stop reason: HOSPADM

## 2017-12-05 RX ORDER — DIAZEPAM 5 MG
5 TABLET ORAL EVERY 6 HOURS PRN
Status: DISCONTINUED | OUTPATIENT
Start: 2017-12-05 | End: 2017-12-06 | Stop reason: HOSPADM

## 2017-12-05 RX ORDER — ONDANSETRON 4 MG/1
4 TABLET, ORALLY DISINTEGRATING ORAL EVERY 6 HOURS PRN
Status: DISCONTINUED | OUTPATIENT
Start: 2017-12-05 | End: 2017-12-06 | Stop reason: HOSPADM

## 2017-12-05 RX ORDER — SODIUM CHLORIDE AND POTASSIUM CHLORIDE 150; 900 MG/100ML; MG/100ML
INJECTION, SOLUTION INTRAVENOUS CONTINUOUS
Status: DISCONTINUED | OUTPATIENT
Start: 2017-12-05 | End: 2017-12-06 | Stop reason: CLARIF

## 2017-12-05 RX ORDER — OXYCODONE HYDROCHLORIDE 5 MG/1
5-10 TABLET ORAL
Status: DISCONTINUED | OUTPATIENT
Start: 2017-12-05 | End: 2017-12-05

## 2017-12-05 RX ORDER — HYDROMORPHONE HYDROCHLORIDE 2 MG/1
2-4 TABLET ORAL
Status: DISCONTINUED | OUTPATIENT
Start: 2017-12-05 | End: 2017-12-06 | Stop reason: HOSPADM

## 2017-12-05 RX ORDER — DROPERIDOL 2.5 MG/ML
0.62 INJECTION, SOLUTION INTRAMUSCULAR; INTRAVENOUS
Status: DISCONTINUED | OUTPATIENT
Start: 2017-12-05 | End: 2017-12-05

## 2017-12-05 RX ORDER — DEXAMETHASONE SODIUM PHOSPHATE 4 MG/ML
INJECTION, SOLUTION INTRA-ARTICULAR; INTRALESIONAL; INTRAMUSCULAR; INTRAVENOUS; SOFT TISSUE PRN
Status: DISCONTINUED | OUTPATIENT
Start: 2017-12-05 | End: 2017-12-05

## 2017-12-05 RX ORDER — FENTANYL CITRATE 50 UG/ML
25-50 INJECTION, SOLUTION INTRAMUSCULAR; INTRAVENOUS
Status: DISCONTINUED | OUTPATIENT
Start: 2017-12-05 | End: 2017-12-05 | Stop reason: HOSPADM

## 2017-12-05 RX ORDER — PROPOFOL 10 MG/ML
INJECTION, EMULSION INTRAVENOUS PRN
Status: DISCONTINUED | OUTPATIENT
Start: 2017-12-05 | End: 2017-12-05

## 2017-12-05 RX ORDER — CEFAZOLIN SODIUM 1 G/50ML
3 SOLUTION INTRAVENOUS
Status: COMPLETED | OUTPATIENT
Start: 2017-12-05 | End: 2017-12-05

## 2017-12-05 RX ORDER — ATORVASTATIN CALCIUM 40 MG/1
80 TABLET, FILM COATED ORAL DAILY
Status: DISCONTINUED | OUTPATIENT
Start: 2017-12-05 | End: 2017-12-06 | Stop reason: HOSPADM

## 2017-12-05 RX ORDER — HYDROMORPHONE HYDROCHLORIDE 1 MG/ML
.3-.5 INJECTION, SOLUTION INTRAMUSCULAR; INTRAVENOUS; SUBCUTANEOUS
Status: DISCONTINUED | OUTPATIENT
Start: 2017-12-05 | End: 2017-12-06 | Stop reason: HOSPADM

## 2017-12-05 RX ORDER — DEXAMETHASONE SODIUM PHOSPHATE 4 MG/ML
4 INJECTION, SOLUTION INTRA-ARTICULAR; INTRALESIONAL; INTRAMUSCULAR; INTRAVENOUS; SOFT TISSUE
Status: DISCONTINUED | OUTPATIENT
Start: 2017-12-05 | End: 2017-12-05 | Stop reason: HOSPADM

## 2017-12-05 RX ORDER — DIPHENHYDRAMINE HYDROCHLORIDE 50 MG/ML
25 INJECTION INTRAMUSCULAR; INTRAVENOUS EVERY 6 HOURS PRN
Status: DISCONTINUED | OUTPATIENT
Start: 2017-12-05 | End: 2017-12-06 | Stop reason: HOSPADM

## 2017-12-05 RX ORDER — KETOROLAC TROMETHAMINE 30 MG/ML
30 INJECTION, SOLUTION INTRAMUSCULAR; INTRAVENOUS
Status: COMPLETED | OUTPATIENT
Start: 2017-12-05 | End: 2017-12-05

## 2017-12-05 RX ORDER — DOCUSATE SODIUM 100 MG/1
100 CAPSULE, LIQUID FILLED ORAL 2 TIMES DAILY
Status: DISCONTINUED | OUTPATIENT
Start: 2017-12-05 | End: 2017-12-06 | Stop reason: HOSPADM

## 2017-12-05 RX ORDER — HYDROCODONE BITARTRATE AND ACETAMINOPHEN 7.5; 325 MG/1; MG/1
1 TABLET ORAL 3 TIMES DAILY PRN
Status: DISCONTINUED | OUTPATIENT
Start: 2017-12-05 | End: 2017-12-06 | Stop reason: HOSPADM

## 2017-12-05 RX ORDER — GLYCOPYRROLATE 0.2 MG/ML
INJECTION, SOLUTION INTRAMUSCULAR; INTRAVENOUS PRN
Status: DISCONTINUED | OUTPATIENT
Start: 2017-12-05 | End: 2017-12-05

## 2017-12-05 RX ORDER — CEFAZOLIN SODIUM 2 G/100ML
2 INJECTION, SOLUTION INTRAVENOUS EVERY 8 HOURS
Status: DISCONTINUED | OUTPATIENT
Start: 2017-12-05 | End: 2017-12-06 | Stop reason: CLARIF

## 2017-12-05 RX ORDER — DEXTROSE MONOHYDRATE 25 G/50ML
25-50 INJECTION, SOLUTION INTRAVENOUS
Status: DISCONTINUED | OUTPATIENT
Start: 2017-12-05 | End: 2017-12-06 | Stop reason: HOSPADM

## 2017-12-05 RX ADMIN — INSULIN GLARGINE 50 UNITS: 100 INJECTION, SOLUTION SUBCUTANEOUS at 19:40

## 2017-12-05 RX ADMIN — INSULIN ASPART 3 UNITS: 100 INJECTION, SOLUTION INTRAVENOUS; SUBCUTANEOUS at 14:34

## 2017-12-05 RX ADMIN — HYDROMORPHONE HYDROCHLORIDE 1 MG: 1 INJECTION, SOLUTION INTRAMUSCULAR; INTRAVENOUS; SUBCUTANEOUS at 08:30

## 2017-12-05 RX ADMIN — SODIUM CHLORIDE, POTASSIUM CHLORIDE, SODIUM LACTATE AND CALCIUM CHLORIDE: 600; 310; 30; 20 INJECTION, SOLUTION INTRAVENOUS at 08:01

## 2017-12-05 RX ADMIN — MIDAZOLAM HYDROCHLORIDE 2 MG: 1 INJECTION, SOLUTION INTRAMUSCULAR; INTRAVENOUS at 08:01

## 2017-12-05 RX ADMIN — Medication 3 G: at 08:01

## 2017-12-05 RX ADMIN — LISINOPRIL 10 MG: 10 TABLET ORAL at 14:12

## 2017-12-05 RX ADMIN — HYDROMORPHONE HYDROCHLORIDE 4 MG: 2 TABLET ORAL at 22:56

## 2017-12-05 RX ADMIN — INSULIN ASPART 5 UNITS: 100 INJECTION, SOLUTION INTRAVENOUS; SUBCUTANEOUS at 17:14

## 2017-12-05 RX ADMIN — FENTANYL CITRATE 50 MCG: 50 INJECTION, SOLUTION INTRAMUSCULAR; INTRAVENOUS at 10:51

## 2017-12-05 RX ADMIN — HYDROMORPHONE HYDROCHLORIDE 4 MG: 2 TABLET ORAL at 20:18

## 2017-12-05 RX ADMIN — Medication 0.5 MG: at 10:59

## 2017-12-05 RX ADMIN — FENTANYL CITRATE 100 MCG: 50 INJECTION, SOLUTION INTRAMUSCULAR; INTRAVENOUS at 08:27

## 2017-12-05 RX ADMIN — ONDANSETRON 4 MG: 2 INJECTION INTRAMUSCULAR; INTRAVENOUS at 09:43

## 2017-12-05 RX ADMIN — DIAZEPAM 5 MG: 5 TABLET ORAL at 14:12

## 2017-12-05 RX ADMIN — DIAZEPAM 5 MG: 5 TABLET ORAL at 20:25

## 2017-12-05 RX ADMIN — HYDROMORPHONE HYDROCHLORIDE 0.5 MG: 1 INJECTION, SOLUTION INTRAMUSCULAR; INTRAVENOUS; SUBCUTANEOUS at 12:27

## 2017-12-05 RX ADMIN — BACLOFEN 10 MG: 10 TABLET ORAL at 14:12

## 2017-12-05 RX ADMIN — DOCUSATE SODIUM 100 MG: 100 CAPSULE, LIQUID FILLED ORAL at 19:40

## 2017-12-05 RX ADMIN — KETOROLAC TROMETHAMINE 30 MG: 30 INJECTION, SOLUTION INTRAMUSCULAR at 10:59

## 2017-12-05 RX ADMIN — SODIUM CHLORIDE, POTASSIUM CHLORIDE, SODIUM LACTATE AND CALCIUM CHLORIDE: 600; 310; 30; 20 INJECTION, SOLUTION INTRAVENOUS at 08:26

## 2017-12-05 RX ADMIN — GLYCOPYRROLATE 0.2 MG: 0.2 INJECTION, SOLUTION INTRAMUSCULAR; INTRAVENOUS at 08:14

## 2017-12-05 RX ADMIN — HYDROMORPHONE HYDROCHLORIDE 2 MG: 2 TABLET ORAL at 14:12

## 2017-12-05 RX ADMIN — Medication 7.5 MG: at 08:54

## 2017-12-05 RX ADMIN — ACETAMINOPHEN 975 MG: 325 TABLET, FILM COATED ORAL at 15:54

## 2017-12-05 RX ADMIN — LIDOCAINE HYDROCHLORIDE 50 MG: 10 INJECTION, SOLUTION INFILTRATION; PERINEURAL at 08:14

## 2017-12-05 RX ADMIN — METFORMIN HYDROCHLORIDE 1000 MG: 500 TABLET ORAL at 17:35

## 2017-12-05 RX ADMIN — CITALOPRAM HYDROBROMIDE 20 MG: 10 TABLET ORAL at 14:11

## 2017-12-05 RX ADMIN — CEFAZOLIN SODIUM 2 G: 2 INJECTION, SOLUTION INTRAVENOUS at 15:54

## 2017-12-05 RX ADMIN — ATORVASTATIN CALCIUM 80 MG: 40 TABLET, FILM COATED ORAL at 15:54

## 2017-12-05 RX ADMIN — DIPHENHYDRAMINE HYDROCHLORIDE 25 MG: 25 CAPSULE ORAL at 16:07

## 2017-12-05 RX ADMIN — ROCURONIUM BROMIDE 50 MG: 10 INJECTION INTRAVENOUS at 08:14

## 2017-12-05 RX ADMIN — Medication 5 MG: at 08:26

## 2017-12-05 RX ADMIN — DEXAMETHASONE SODIUM PHOSPHATE 4 MG: 4 INJECTION, SOLUTION INTRA-ARTICULAR; INTRALESIONAL; INTRAMUSCULAR; INTRAVENOUS; SOFT TISSUE at 08:14

## 2017-12-05 RX ADMIN — FENTANYL CITRATE 150 MCG: 50 INJECTION, SOLUTION INTRAMUSCULAR; INTRAVENOUS at 08:14

## 2017-12-05 RX ADMIN — PROPOFOL 200 MG: 10 INJECTION, EMULSION INTRAVENOUS at 08:14

## 2017-12-05 RX ADMIN — BACLOFEN 10 MG: 10 TABLET ORAL at 19:40

## 2017-12-05 RX ADMIN — GLYCOPYRROLATE 0.8 MG: 0.2 INJECTION, SOLUTION INTRAMUSCULAR; INTRAVENOUS at 08:26

## 2017-12-05 RX ADMIN — POTASSIUM CHLORIDE AND SODIUM CHLORIDE: 900; 150 INJECTION, SOLUTION INTRAVENOUS at 13:13

## 2017-12-05 RX ADMIN — HYDROMORPHONE HYDROCHLORIDE 4 MG: 2 TABLET ORAL at 17:35

## 2017-12-05 RX ADMIN — PROPOFOL 100 MCG/KG/MIN: 10 INJECTION, EMULSION INTRAVENOUS at 08:27

## 2017-12-05 RX ADMIN — FENTANYL CITRATE 50 MCG: 50 INJECTION, SOLUTION INTRAMUSCULAR; INTRAVENOUS at 10:31

## 2017-12-05 NOTE — ANESTHESIA POSTPROCEDURE EVALUATION
Patient: Tony S Aschoff    Procedure(s):  c5-6 anterior cervical discectomy and fusion - Wound Class: I-Clean    Diagnosis:C5-6 severe stenosis with cord compression and mylopathy  Diagnosis Additional Information: 1. C5-6 anterior cervical discectomy with arthrodesis and placement of a 7 mm Globus [place] PEEK interbody graft with Globus Signify placed centrally  2. Placement of a 14 mm Globus [place] anterior cervical plate with four 15mm screws  3. Use o, f C-arm and microscope  4. Use of intraoperative spinal cord monitoring    Anesthesia Type:  General    Note:  Anesthesia Post Evaluation    Patient location during evaluation: PACU  Patient participation: Able to fully participate in evaluation  Level of consciousness: awake and alert  Pain management: adequate  Airway patency: patent  Cardiovascular status: acceptable  Respiratory status: acceptable  Hydration status: acceptable  PONV: controlled     Anesthetic complications: None          Last vitals:  Vitals:    12/05/17 1120 12/05/17 1140 12/05/17 1202   BP: (!) 157/97  (!) 167/96   Pulse:      Resp: 12 11 16   Temp:  98.8  F (37.1  C) 97.7  F (36.5  C)   SpO2: 95% 95% 94%         Electronically Signed By: Terence Booth MD  December 5, 2017  12:26 PM

## 2017-12-05 NOTE — CONSULTS
[facility]  Hospitalist Consult Note  Name: Tony S Aschoff    MRN: [id number]  YOB: 1960    Age: 57 year old  Date of admission: 12/5/2017  Primary care provider: Clinic, [facility]     Requesting Physician:  Dr. Butterfield  Reason for consult:  Post-operative medical management          Assessment and Plan:   Tony S Aschoff is a 57 year old male with a history of IDDM type II, HTN, obesity, GERALD on bipap, HLD, and chronic neck pain who was admitted on 12/5/17 for C5-6 discectomy with placement of interbody graft and anterior plate by Dr. Butterfield.    1. Severe C5-C6 stenos:  s/p C5-6 discectomy with placement of interbody graft and anterior plate by Dr. Butterfield.  The patient is doing well.  Currently has well controlled pain and is hemodynamically stable. Denies pain/numbness in extremities.  In a soft collar.  Will defer diet, activity, DVT prophylaxis, and pain control to the primary team.  Continue physical and occupational therapy. Continue incentive spirometry and follow hemoglobin to evaluate for surgical blood loss and potential need for transfusion.     2. IDDM type II: pta on basaglar 50 U bid, aspart at 5 units per carb (not the 30 U tid with meals that is in his med list), metformin 1g bid, and recently trulicity injections weekly.  Last A1c was 7.5.   now post op.  Did receive decadron in surgery so may have elevation in his blood sugars.  -resume glargine 50U bid  -start with aspart at 3U per carb along with high dose SSI   -resume metformin    3. HTN: resume pta lisinopril 10mg daily    4. GERALD: has his home bipap with that he will use when sleeping.    5. Obesity: BMI 39    Code status: full code  Prophylaxis: per primary team  Disposition: per primary team. PT/OT    Thank you for the consultation, we will continue to follow along during the hospitalization. Please page with any questions or concerns.         History of Present Illness:   Tony S Aschoff is a 57  year old male with a history of IDDM type II, HTN, obesity, GERALD on bipap, HLD, and chronic neck pain who was admitted on 12/5/17 for C5-6 discectomy with placement of interbody graft and anterior plate by Dr. Butterfield.  He has been having neck pain for the past year that has worsened over the past 2-4 weeks.  Also has now had some weakness of his arms and felt uncoordinated walking prompting more urgent intervention.  He denies any other recent illness.  No chest pain or sob.  No palpitations.  Took 1/2 dose of his basaglar this morning.  Aspirin was held pre-op.  Pre-operative note was fully reviewed and recommendations acknowledged. Op note and anesthesia notes and flow sheets reviewed.     Currently pain is adequately controlled. No nausea, vomiting.  No chest pain, palpitations, dyspnea. Tolerating some liquid oral intake. No excessive somnolence and patient is fully alert and oriented. The patient has no other complaints at this time.              Past Medical History reviewed:     Past Medical History:   Diagnosis Date     Arthritis      Diabetes (H)      Hypertension      Pancreatic disease     pancreatitis acute episode     Sleep apnea     uses cpap             Past Surgical History reviewed:     Past Surgical History:   Procedure Laterality Date     COLONOSCOPY       ENT SURGERY      tonsillectomy and adenoidectomy     ORTHOPEDIC SURGERY      right total hip arthroplasty, shoulder arthroscopy and hand surgery               Social History reviewed:     Social History   Substance Use Topics     Smoking status: Never Smoker     Smokeless tobacco: Never Used     Alcohol use Yes      Comment: 2 drinks daily             Family History reviewed:   History reviewed. No pertinent family history.          Allergies:     Allergies   Allergen Reactions     Adhesive Tape Rash             Medications:     Prior to Admission medications    Medication Sig Last Dose Taking? Auth Provider   dulaglutide (TRULICITY) 1.5 MG/0.5ML  "pen Inject 1.5 mg Subcutaneous every 7 days  12/5/2017 at 0400 Yes Reported, Patient   BASAGLAR 100 UNIT/ML injection Inject 50 Units Subcutaneous 2 times daily 12/5/2017 at 0400 Yes Reported, Patient   insulin aspart (NOVOLOG FLEXPEN) 100 UNIT/ML injection Inject 30 Units Subcutaneous 3 times daily (with meals) 12/4/2017 at 1600 Yes Reported, Patient   ATORVASTATIN CALCIUM PO Take 80 mg by mouth daily  12/4/2017 at 1600 Yes Reported, Patient   LISINOPRIL PO Take 10 mg by mouth daily 12/4/2017 at 0630 Yes Reported, Patient   citalopram (CELEXA) 10 MG/5ML solution Take 20 mg by mouth daily 12/4/2017 at 0630 Yes Reported, Patient   BACLOFEN PO Take 10 mg by mouth 3 times daily Past Week at Unknown time Yes Reported, Patient   HYDROcodone-acetaminophen (NORCO) 7.5-325 MG per tablet Take 1 tablet by mouth 3 times daily as needed for moderate to severe pain 12/4/2017 at 1600 Yes Reported, Patient   METFORMIN HCL PO Take 500 mg by mouth 2 times daily (with meals) Take 2 tablets twice daily 12/4/2017 at 1600 Yes Reported, Patient       Current hospital administered medication list (MAR) also reviewed.          Review of Systems:   A comprehensive greater than 10 system review of systems was carried out.  Pertinent positives and negatives are noted above.  Otherwise negative for contributory info.            Physical Exam:   Blood pressure (!) 145/92, pulse 73, temperature 97.7  F (36.5  C), temperature source Oral, resp. rate 13, height 1.778 m (5' 10\"), SpO2 93 %.    Exam:  Constitutional: Awake, NAD  Eyes: sclera white   HEENT: soft collar around neck, MMM  Respiratory: lungs cta bilaterally, no crackles or wheeze  Cardiovascular: RRR.  No murmur   GI: obese, non-tender, not distended, bowel sounds present  Skin: no rash or lesions, acyanotic  Musculoskeletal/extremities: No edema  Neurologic: A&O, speech clear, can wiggle toes and fingers, light touch sensation intact peripherally  Psychiatric: calm, cooperative, normal " affect           Data:   Imaging:  Reviewed.    EKG/Telemetry:  Reviewed.    Labs: Reviewed.     Recent Labs  Lab 12/05/17  0620   HGB 15.7       Recent Labs  Lab 12/05/17  1235 12/05/17  1009 12/05/17  0556   * 172* 164*       Rom Stephen MD  [facility] Hospitalist  December 5, 2017

## 2017-12-05 NOTE — PROVIDER NOTIFICATION
"Paged admission pager with the following message \"new surgical admit. pt is diabetic and also wants eye drops ordered for \"allergy eyes\". thank you!\"  "

## 2017-12-05 NOTE — ANESTHESIA CARE TRANSFER NOTE
Patient: Tony S Aschoff    Procedure(s):  c5-6 anterior cervical discectomy and fusion - Wound Class: I-Clean    Diagnosis: C5-6 severe stenosis with cord compression and mylopathy  Diagnosis Additional Information: No value filed.    Anesthesia Type:   General     Note:  Airway :Face Mask  Patient transferred to:PACU  Comments: Spont Resps. Follows commands. Extubated. Maintains Resps. Tx to pacu. Report to RNHandoff Report: Identifed the Patient, Identified the Reponsible Provider, Reviewed the pertinent medical history, Discussed the surgical course, Reviewed Intra-OP anesthesia mangement and issues during anesthesia, Set expectations for post-procedure period and Allowed opportunity for questions and acknowledgement of understanding      Vitals: (Last set prior to Anesthesia Care Transfer)    CRNA VITALS  12/5/2017 0932 - 12/5/2017 1007      12/5/2017             Pulse: 97    SpO2: 94 %    Resp Rate (observed): (!)  2                Electronically Signed By: KERMIT Barnett CRNA  December 5, 2017  10:07 AM

## 2017-12-05 NOTE — PLAN OF CARE
Problem: Patient Care Overview  Goal: Plan of Care/Patient Progress Review  Outcome: Improving  reji PO well, up with SBA and gait belt, soft collar on when out of bed, MARIA L drain patent, PO pain meds managing pain, voiding in good amts, plans to dc to tomorrow with brother transporting

## 2017-12-05 NOTE — OP NOTE
OPERATIVE NOTE      Pre op Diagnosis: C5-6 severe stenosis with myelomalacia and cervical myelopathy     Post op Diagnosis: Same    Procedure:   1. C5-6 anterior cervical discectomy with arthrodesis and placement of a 7 mm Globus Colonial PEEK interbody graft with Globus Signify placed centrally  2. Placement of a 14 mm Globus Boundary anterior cervical plate with four 15mm screws  3. Use of C-arm and microscope  4. Use of intraoperative spinal cord monitoring    Surgeon: Boo Butterfield MD    Assistant: Christiano Toscano      Indication for procedure: The patient is a 57 year old male that presented with cervical myelopathy. After reviewing the patient's imaging studies and examination, the decision was made to proceed with the above procedure. The patient understood the risks and benefits of surgery and wanted to proceed.    Description of Procedure: The patient was seen in the pre op area and the procedure was discussed with the patient and brother once again and all questions were answered. The consent was then signed and the patient's neck was marked with a marker. The patient was then transferred to the operating suite on a stretcher and received general endotracheal anesthesia. He was then placed on the operating table in the supine position with all pressure points padded. Spinal cord monitoring leads were placed and baseline values were obtained. A small roll was placed under his shoulders for slight extension and the motor and sensory stimulations were performed again. Next, the C-arm fluoroscopy was brought in the operating room for localization of the C5-6 disk space. Next, the patient's neck was then prepped and draped in a normal sterile fashion and a transverse incision was marked along the C5-6 interspace. Next, local anesthesia was placed along the planned incision and a 10 blade scalpel was used to make the incision. The platysma muscle was then identified, undermined and incised with the  Metzenbaum scissors. The Weitlaner retractors were used to retract the platysma muscle and the skin edges. A dissection plane was then made with both sharp and blunt dissection medical to the sternocliedomastoid muscle and the carotid artery down to the prevertebral fascia. The esophagus and trachea were then retracted laterally with the Cloward retractor and the prevertebral fascia was clean with Kitners.  A spinal need was placed in the C5-6 interspace and verified with C-arm fluoroscopy. The longus coli muscles were then elevated with the bovie cautery and the  retractor was placed under the muscle. The C5-6 interspace was incised with the bovie cautery and the disk was then removed with the Midas Wilfrido drill down to the posterior longitudinal ligament. The ligament was then penetrated with a microball hook and the Kerrison rongeur was used to remove the posterior longitudinal ligament. All foramen were decompressed and the exiting nerve roots were decompressed and verified by with the microball hook. A 7 mm trial was placed in the C5-6 interspace and noted to be the appropriate size, therefore, 7 mm PEEK interbody graft was placed in the C5-6 interspace under fluoroscopic guidance and noted to have an appropriate fit. Next, the size 14 mm anterior cervical plate was secured from C5 to C6 with four 15 mm screws which were locked in placed with the hand held locking bit. The wound was then copiously irrigated and hemostasis was obtained with bipolar cautery, gelfoam and Surgiflo. A MARIA L drain was placed in the operative bed and the retractors were removed and there was no bleeding or injury to the carotid artery. The wound was irrigated once again and the platysma muscle was closed with 2-0 vicryl and the skin edges were closed with 3-0 vicryl and dermabond. The wound was dressed appropriately and the patient was then extubated and transferred to recovery.    At the end of the case all counts were  correct    Spinal cord monitoring remained stable throughout the case    Complications: No    Estimated blood loss: 5 ml    IV Fluid: See Anesthesia     The patient received Ancef preoperatively      Boo Butterfield MD, MS, FAANS

## 2017-12-05 NOTE — IP AVS SNAPSHOT
MRN:[id number]                      After Visit Summary   12/5/2017    Tony S Aschoff    MRN: [id number]           Thank you!     Thank you for choosing [facility] for your care. Our goal is always to provide you with excellent care. Hearing back from our patients is one way we can continue to improve our services. Please take a few minutes to complete the written survey that you may receive in the mail after you visit. If you would like to speak to someone directly about your visit please contact Patient Relations at [phone]. Thank you!          Patient Information     Date Of Birth          1960        Designated Caregiver       Most Recent Value    Caregiver    Will someone help with your care after discharge? yes    Name of designated caregiver Pat Aschoff (Brother)    Phone number of caregiver [phone]    Caregiver address [facility]      About your hospital stay     You were admitted on:  December 5, 2017 You last received care in the:  [facility]    You were discharged on:  December 6, 2017        Reason for your hospital stay       You were in the hospital for a C5-6 ACDF                  Who to Call     For medical emergencies, please call 911.  For non-urgent questions about your medical care, please call your primary care provider or clinic, [phone]  For questions related to your surgery, please call your surgery clinic        Attending Provider     Provider Specialty    Boo Butterfield MD Neurosurgery       Primary Care Provider Office Phone # Fax #    [facility] [phone]      After Care Instructions     Activity       Your activity upon discharge: Discharge instructions:  No lifting of more than 10 pounds, no bending, no twisting until follow up visit.  Wear brace when out of bed as needed for 2 weeks     Ok to shampoo hair, shower or bathe, but, do not scrub or submerge incision  under water until evaluated post-operatively in clinic.    Ok to walk as tolerated, avoid bed rest and prolonged sitting.    No contact sports until after follow up visit  No high impact activities such as; running/jogging, snowmobile or 4 culver riding or any other recreational vehicles.    Do not take NSAIDS (ibuprofen, advil, aleve, naproxen, etc) for pain control.    Do NOT drive while taking narcotic pain medication.    Call the [facility] at [phone] for increasing redness, swelling or pus draining from the incision, increased pain or any other questions and concerns.            Diet       Follow this diet upon discharge: regular diet            Wound care and dressings       Instructions to care for your wound at home: Keep your incision clean and dry at all times.  OK to remove dressing on postop day 2.  OK to shower on postop day 3 and allow water to run over incision, pat dry after shower.  No bathing swimming or submerging in water.  Call immediately or come to ED if any drainage occurs, or you develop new pain, redness, or swelling.                  Follow-up Appointments     Follow-up and recommended labs and tests        Please follow up at the [facility] in 6 weeks for follow up with xray prior.  Please call the clinic at [phone] to schedule your appointment with Marielena Ferreira CNP or Abeba Hamilton CNP                  Future tests that were ordered for you     XR Cervical Spine 2/3 Views                 Further instructions from your care team       While on narcotic pain medication, to prevent constipation:  1. Drink plenty of water to keep well hydrated   2. May take over the counter Colace or Senna (follow instructions on label)    Call your physician if you experience:  1. Fever greater than 100 degrees with body chills or excessive sweating.  2. Increased redness, localized warmth, tenderness, drainage or swelling at incision site.  Opening or pulling apart  "of incision site.   3. Pain not controlled with oral pain medications, ice and rest.   4. No bowel movement in 3 days (may use Milk of Magnesia or other over the counter remedy).  5. Chest pain, shortness of breath, and/or calf pain with excessive swelling.  6. Generalized feeling of illness.  7. Any other questions or concerns related to your surgery/recovery.      Thank you for allowing [facility] to participate in your cares!!    Pending Results     No orders found for last 3 day(s).            Statement of Approval     Ordered          17 0838  I have reviewed and agree with all the recommendations and orders detailed in this document.  EFFECTIVE NOW     Approved and electronically signed by:  Marielena Ferreira APRN CNP             Admission Information     Date & Time Provider Department Dept. Phone    2017 Boo Butterfield MD [facility] [phone]      Your Vitals Were     Blood Pressure Pulse Temperature Respirations Height Pulse Oximetry    122/66 73 97.2  F (36.2  C) (Oral) 16 1.778 m (5' 10\") 99%      MyChart Information     [employer] lets you send messages to your doctor, view your test results, renew your prescriptions, schedule appointments and more. To sign up, go to [email or web address] . Click on \"Log in\" on the left side of the screen, which will take you to the Welcome page. Then click on \"Sign up Now\" on the right side of the page.     You will be asked to enter the access code listed below, as well as some personal information. Please follow the directions to create your username and password.     Your access code is: [email or web address]  Expires: 2018  9:18 AM     Your access code will  in 90 days. If you need help or a new code, please call your [place] clinic or [phone].        Care EveryWhere ID     This is your Care EveryWhere ID. This could be used by other organizations to access your [place] medical " records  [id number]        Equal Access to Services     MICHELET TAYLOR : Hadii aad ku hadpreethisimone Whaley, waaxda luqadaha, qaybta kaalmamax green, mary jane smith. [facility] [phone].    ATENCIÓN: Si habla español, tiene a fernández disposición servicios gratuitos de asistencia lingüística. Llame al [phone].    We comply with applicable federal civil rights laws and [place] laws. We do not discriminate on the basis of race, color, national origin, age, disability, sex, sexual orientation, or gender identity.               Review of your medicines      START taking        Dose / Directions    diazepam 5 MG tablet   Commonly known as:  VALIUM   Notes to Patient:  Next dose available to be taken         Dose:  5 mg   Take 1 tablet (5 mg) by mouth every 6 hours as needed for other (muscle spasms)   Quantity:  60 tablet   Refills:  0       HYDROmorphone 2 MG tablet   Commonly known as:  DILAUDID   Notes to Patient:  Next dose available to be taken after 11:15 am        Dose:  2-4 mg   Take 1-2 tablets (2-4 mg) by mouth every 3 hours as needed for moderate to severe pain   Quantity:  75 tablet   Refills:  0         CONTINUE these medicines which have NOT CHANGED        Dose / Directions    ATORVASTATIN CALCIUM PO   Notes to Patient:  Resume per home schedule        Dose:  80 mg   Take 80 mg by mouth daily   Refills:  0       BACLOFEN PO   Notes to Patient:  Resume per home schedule        Dose:  10 mg   Take 10 mg by mouth 3 times daily   Refills:  0       BASAGLAR 100 UNIT/ML injection   Notes to Patient:  Resume per home schedule        Dose:  50 Units   Inject 50 Units Subcutaneous 2 times daily   Refills:  0       citalopram 10 MG/5ML solution   Commonly known as:  celeXA   Notes to Patient:  Resume per home schedule        Dose:  20 mg   Take 20 mg by mouth daily   Refills:  0       dulaglutide 1.5 MG/0.5ML pen   Commonly known as:  TRULICITY   Notes to Patient:  Resume per home schedule         Dose:  1.5 mg   Inject 1.5 mg Subcutaneous every 7 days   Refills:  0       LISINOPRIL PO   Notes to Patient:  Resume per home schedule        Dose:  10 mg   Take 10 mg by mouth daily   Refills:  0       METFORMIN HCL PO   Notes to Patient:  Resume per home schedule        Dose:  500 mg   Take 500 mg by mouth 2 times daily (with meals) Take 2 tablets twice daily   Refills:  0       NovoLOG FLEXPEN 100 UNIT/ML injection   Generic drug:  insulin aspart   Notes to Patient:  Resume per home schedule        Dose:  30 Units   Inject 30 Units Subcutaneous 3 times daily (with meals)   Refills:  0         STOP taking     HYDROcodone-acetaminophen 7.5-325 MG per tablet   Commonly known as:  NORCO                Where to get your medicines      Some of these will need a paper prescription and others can be bought over the counter. Ask your nurse if you have questions.     Bring a paper prescription for each of these medications     diazepam 5 MG tablet    HYDROmorphone 2 MG tablet                Protect others around you: Learn how to safely use, store and throw away your medicines at www.disposemymeds.org.             Medication List: This is a list of all your medications and when to take them. Check marks below indicate your daily home schedule. Keep this list as a reference.      Medications           Morning Afternoon Evening Bedtime As Needed    ATORVASTATIN CALCIUM PO   Take 80 mg by mouth daily   Last time this was given:  80 mg on 12/5/2017  3:54 PM   Notes to Patient:  Resume per home schedule                                BACLOFEN PO   Take 10 mg by mouth 3 times daily   Last time this was given:  10 mg on 12/6/2017  8:12 AM   Notes to Patient:  Resume per home schedule                                BASAGLAR 100 UNIT/ML injection   Inject 50 Units Subcutaneous 2 times daily   Notes to Patient:  Resume per home schedule                                citalopram 10 MG/5ML solution   Commonly known as:  celeXA    Take 20 mg by mouth daily   Notes to Patient:  Resume per home schedule                                diazepam 5 MG tablet   Commonly known as:  VALIUM   Take 1 tablet (5 mg) by mouth every 6 hours as needed for other (muscle spasms)   Last time this was given:  5 mg on 12/5/2017  8:25 PM   Notes to Patient:  Next dose available to be taken                             Next dose available to be taken       dulaglutide 1.5 MG/0.5ML pen   Commonly known as:  TRULICITY   Inject 1.5 mg Subcutaneous every 7 days   Notes to Patient:  Resume per home schedule                                HYDROmorphone 2 MG tablet   Commonly known as:  DILAUDID   Take 1-2 tablets (2-4 mg) by mouth every 3 hours as needed for moderate to severe pain   Last time this was given:  4 mg on 12/6/2017  8:13 AM   Notes to Patient:  Next dose available to be taken after 11:15 am                            Next dose available to be taken after 11:15 pm       LISINOPRIL PO   Take 10 mg by mouth daily   Last time this was given:  10 mg on 12/6/2017  8:13 AM   Notes to Patient:  Resume per home schedule                                METFORMIN HCL PO   Take 500 mg by mouth 2 times daily (with meals) Take 2 tablets twice daily   Last time this was given:  1,000 mg on 12/6/2017  8:13 AM   Notes to Patient:  Resume per home schedule                                NovoLOG FLEXPEN 100 UNIT/ML injection   Inject 30 Units Subcutaneous 3 times daily (with meals)   Last time this was given:  13 Units on 12/6/2017  9:41 AM   Generic drug:  insulin aspart   Notes to Patient:  Resume per home schedule                                          More Information        Discharge Instructions for Laminectomy  A surgeon removed a piece of bone from your spine called the lamina. This procedure is called laminectomy. Its purpose is to relieve the pressure caused by a bulging disk that painfully pushes on a nerve. Below are some care tips you can follow at home to help  you feel better.  Activity    Avoid pushing, pulling, bending, or twisting for 2 week(s) after your surgery.    Don t sit for more than 20 to 30 minutes at a time. And when you aren t sitting, lie down or walk.    Walk as much as you can. You can walk outside or inside. If you use a treadmill, walk at a slow speed, with no incline.    Going up and down stairs is also good for you, so do it as much as possible. Don t lift anything heavier than 10 pounds until your doctor says otherwise.    Don t drive for 2 to 3 weeks after your surgery. And never drive if you are taking opioid pain medication. Let others drive you instead. And limit car trips to 20 to 30 minutes at a time.    Have someone remove electrical cords, throw rugs, and anything else in your home that may cause you to fall.    Arrange your household to keep the items you need handy.  Home care    Take your medicine exactly as directed by your doctor.    Check your incision daily for redness, tenderness, or drainage.    Don t soak in a bathtub, hot tub, or pool until your doctor says it s OK.    Wait 3 day(s) after your surgery to start showering. Then shower as needed. Carefully wash your incision with soap and water. Gently pat the incision dry. Don t rub it, or apply creams or lotions.  Follow-up    Make a follow-up appointment as directed by your doctor.    Make an appointment to have sutures or staples removed about 2 weeks after surgery.     When to seek medical attention  Call 911 right away if you have any of the following:    Chest pain    Shortness of breath    A severe headache    Trouble controlling your bowels or bladder  Otherwise, call your doctor right away if you have any of the following:    Increased pain, redness, or drainage from the incision    Fever above 100.4 F (38.0 C) or shaking chills    New pain, weakness, warmth, or numbness in your legs    Foot, ankle, or calf swelling that is not relieved by elevating your feet   Date Last  Reviewed: 11/16/2015 2000-2017 The "[employer]". [address] [place] [address], [place] [address]. All rights reserved. This information is not intended as a substitute for professional medical care. Always follow your healthcare professional's instructions.                Using an Incentive Spirometer    An incentive spirometer is a device that helps you do deep breathing exercises. These exercises expand your lungs, aid in circulation, and help prevent pneumonia. Deep breathing exercises also help you breathe better and improve the function of your lungs by:    Keeping your lungs clear    Strengthening your breathing muscles    Helping prevent respiratory complications or problems  The incentive spirometer gives you a way to take an active part in recover. A nurse or therapist will teach you breathing exercises. To do these exercises, you will breathe in through your mouth and not your nose. The incentive spirometer only works correctly if you breathe in through your mouth.  Steps to clear lungs  Step 1. Exhale normally. Then, inhale normally.    Relax and breathe out.  Step 2. Place your lips tightly around the mouthpiece.    Make sure the device is upright and not tilted.  Step 3. Inhale as much air as you can through the mouthpiece (don't breath through your nose).    Inhale slowly and deeply.    Hold your breath long enough to keep the balls or disk raised for at least 3 to 5 seconds, or as instructed by your healthcare provider.    Some spirometers have an indicator to let you know that you are breathing in too fast. If the indicator goes off, breathe in more slowly.  Step 4. Repeat the exercise regularly.    Do this exercise every hour while you're awake, or as instructed by your healthcare provider.    If you were taught deep breathing and coughing exercises, do them regularly as instructed by your healthcare provider.   Follow-up care  Make a follow up appointment, or as directed. Also, follow up with  your healthcare provider as advised or if your symptoms do not improve or continue to get worse.  When to call your healthcare provider  Call your healthcare provider right away if you have any of the following:    Fever 100.4  (38 C) or higher, or as directed by your healthcare provider    Brownish, bloody, or smelly sputum  Call 911  Call 911 if any of these occur:     Shortness of breath that doesn't get better after taking your medicine    Cool, moist, pale or blue skin    Trouble breathing or swallowing, wheezing    Fainting or loss of consciousness    Feeling of fizziness or weakness or a sudden drop in blood pressure    Feeling of doom or lightheaded    Chest pain or rapid heart rate  Date Last Reviewed: 1/1/2017 2000-2017 [employer]. [address] [place] [address], [place] [address]. All rights reserved. This information is not intended as a substitute for professional medical care. Always follow your healthcare professional's instructions.                Constipation (Adult)  Constipation means that you have bowel movements that are less frequent than usual. Stools often become very hard and difficult to pass.  Constipation is very common. At some point in life it affects almost everyone. Since everyone's bowel habits are different, what is constipation to one person may not be to another. Your healthcare provider may do tests to diagnose constipation. It depends on what he or she finds when evaluating you.    Symptoms of constipation include:    Abdominal pain    Bloating    Vomiting    Painful bowel movements    Itching, swelling, bleeding, or pain around the anus  Causes  Constipation can have many causes. These include:    Diet low in fiber    Too much dairy    Not drinking enough liquids    Lack of exercise or physical activity. This is especially true for older adults.    Changes in lifestyle or daily routine, including pregnancy, aging, work, and travel    Frequent use or misuse of  laxatives    Ignoring the urge to have a bowel movement or delaying it until later    Medicines, such as certain prescription pain medicines, iron supplements, antacids, certain antidepressants, and calcium supplements    Diseases like irritable bowel syndrome, bowel obstructions, stroke, diabetes, thyroid disease, Parkinson disease, hemorrhoids, and colon cancer  Complications  Potential complications of constipation can include:    Hemorrhoids    Rectal bleeding from hemorrhoids or anal fissures (skin tears)    Hernias    Dependency on laxatives    Chronic constipation    Fecal impaction    Bowel obstruction or perforation  Home care  All treatment should be done after talking with your healthcare provider. This is especially true if you have another medical problems, are taking prescription medicines, or are an older adult. Treatment most often involves lifestyle changes. You may also need medicines. Your healthcare provider will tell you which will work best for you. Follow the advice below to help avoid this problem in the future.  Lifestyle changes  These lifestyle changes can help prevent constipation:    Diet. Eat a high-fiber diet, with fresh fruit and vegetables, and reduce dairy intake, meats, and processed foods    Fluids. It's important to get enough fluids each day. Drink plenty of water when you eat more fiber. If you are on diet that limits the amount of fluid you can have, talk about this with your healthcare provider.    Regular exercise. Check with your healthcare provider first.  Medications  Take any medicines as directed. Some laxatives are safe to use only every now and then. Others can be taken on a regular basis. Talk with your doctor or pharmacist if you have questions.  Prescription pain medicines can cause constipation. If you are taking this kind of medicine, ask your healthcare provider if you should also take a stool softener.  Medicines you may take to treat constipation  include:    Fiber supplements    Stool softeners    Laxatives    Enemas    Rectal suppositories  Follow-up care  Follow up with your healthcare provider if symptoms don't get better in the next few days. You may need to have more tests or see a specialist.  Call 911  Call 911 if any of these occur:    Trouble breathing    Stiff, rigid abdomen that is severely painful to touch    Confusion    Fainting or loss of consciousness    Rapid heart rate    Chest pain  When to seek medical advice  Call your healthcare provider right away if any of these occur:    Fever over 100.4 F (38 C)    Failure to resume normal bowel movements    Pain in your abdomen or back gets worse    Nausea or vomiting    Swelling in your abdomen    Blood in the stool    Black, tarry stool    Involuntary weight loss    Weakness  Date Last Reviewed: 12/30/2015 2000-2017 [employer]. [address] [place] [address], [place] [address]. All rights reserved. This information is not intended as a substitute for professional medical care. Always follow your healthcare professional's instructions.

## 2017-12-05 NOTE — PROGRESS NOTES
Arrived to room 627 from PACU at 1120 via cart, dangled and ambulated to bathroom with SBA to void, unable to void standing at bedside with nursing staff nearby per pt so hat placed in toilet to collect urine and pt instructed to not get up without staff and to pull bathroom call light when done- pt followed instructions completely; alert and oriented x 4, oriented to room and call system, dressing CDI, CMS intact, IV patent and infusing, MARIA L drain compressed and is patent, reviewed welcome folder with patient and brother. SCD's on BLE. Mihaela ice chips and water well. Frequent VS started.

## 2017-12-05 NOTE — PHARMACY-ADMISSION MEDICATION HISTORY
Pharmacy reviewed prior to admission med list from pre-admitting rn, MANINDER Witt.      Prior to Admission medications    Medication Sig Last Dose Taking? Auth Provider   dulaglutide (TRULICITY) 1.5 MG/0.5ML pen Inject 1.5 mg Subcutaneous every 7 days  12/5/2017 at 0400 Yes Reported, Patient   BASAGLAR 100 UNIT/ML injection Inject 50 Units Subcutaneous 2 times daily 12/5/2017 at 0400 Yes Reported, Patient   insulin aspart (NOVOLOG FLEXPEN) 100 UNIT/ML injection Inject 30 Units Subcutaneous 3 times daily (with meals) 12/4/2017 at 1600 Yes Reported, Patient   ATORVASTATIN CALCIUM PO Take 80 mg by mouth daily  12/4/2017 at 1600 Yes Reported, Patient   LISINOPRIL PO Take 10 mg by mouth daily 12/4/2017 at 0630 Yes Reported, Patient   citalopram (CELEXA) 10 MG/5ML solution Take 20 mg by mouth daily 12/4/2017 at 0630 Yes Reported, Patient   BACLOFEN PO Take 10 mg by mouth 3 times daily Past Week at Unknown time Yes Reported, Patient   HYDROcodone-acetaminophen (NORCO) 7.5-325 MG per tablet Take 1 tablet by mouth 3 times daily as needed for moderate to severe pain 12/4/2017 at 1600 Yes Reported, Patient   METFORMIN HCL PO Take 500 mg by mouth 2 times daily (with meals) Take 2 tablets twice daily 12/4/2017 at 1600 Yes Reported, Patient

## 2017-12-05 NOTE — ANESTHESIA PREPROCEDURE EVALUATION
Anesthesia Evaluation     .             ROS/MED HX    ENT/Pulmonary:     (+)sleep apnea, uses CPAP , . .    Neurologic:     (+)other neuro cervical radiculopathy    Cardiovascular:     (+) hypertension----. : . . . :. .       METS/Exercise Tolerance:     Hematologic:  - neg hematologic  ROS       Musculoskeletal:  - neg musculoskeletal ROS       GI/Hepatic:  - neg GI/hepatic ROS       Renal/Genitourinary:         Endo:     (+) type II DM Not using insulin - not using insulin pump not previously admitted for DM/DKA .      Psychiatric:         Infectious Disease:  - neg infectious disease ROS       Malignancy:      - no malignancy   Other:    (+) No chance of pregnancy C-spine cleared: N/A, no H/O Chronic Pain,no other significant disability   - neg other ROS                 Physical Exam  Normal systems: cardiovascular, pulmonary and dental    Airway   Mallampati: II  TM distance: >3 FB  Neck ROM: full    Dental     Cardiovascular       Pulmonary                     Anesthesia Plan      History & Physical Review  History and physical reviewed and following examination; no interval change.    ASA Status:  2 .    NPO Status:  > 8 hours    Plan for General with Intravenous induction. Maintenance will be Balanced.    PONV prophylaxis:  Ondansetron (or other 5HT-3) and Dexamethasone or Solumedrol       Postoperative Care  Postoperative pain management:  IV analgesics.      Consents  Anesthetic plan, risks, benefits and alternatives discussed with:  Patient.  Use of blood products discussed: Yes.   Use of blood products discussed with Patient.  Consented to blood products.  .                          .

## 2017-12-06 ENCOUNTER — APPOINTMENT (OUTPATIENT)
Dept: PHYSICAL THERAPY | Facility: CLINIC | Age: 57
DRG: 472 | End: 2017-12-06
Attending: NEUROLOGICAL SURGERY
Payer: COMMERCIAL

## 2017-12-06 VITALS
RESPIRATION RATE: 16 BRPM | SYSTOLIC BLOOD PRESSURE: 122 MMHG | HEIGHT: 70 IN | HEART RATE: 73 BPM | TEMPERATURE: 97.2 F | DIASTOLIC BLOOD PRESSURE: 66 MMHG | OXYGEN SATURATION: 99 %

## 2017-12-06 LAB
CREAT SERPL-MCNC: 0.8 MG/DL (ref 0.66–1.25)
ERYTHROCYTE [DISTWIDTH] IN BLOOD BY AUTOMATED COUNT: 13.7 % (ref 10–15)
GFR SERPL CREATININE-BSD FRML MDRD: >90 ML/MIN/1.7M2
GLUCOSE BLDC GLUCOMTR-MCNC: 197 MG/DL (ref 70–99)
GLUCOSE BLDC GLUCOMTR-MCNC: 221 MG/DL (ref 70–99)
HCT VFR BLD AUTO: 44.6 % (ref 40–53)
HGB BLD-MCNC: 14.2 G/DL (ref 13.3–17.7)
MCH RBC QN AUTO: 32.3 PG (ref 26.5–33)
MCHC RBC AUTO-ENTMCNC: 31.8 G/DL (ref 31.5–36.5)
MCV RBC AUTO: 101 FL (ref 78–100)
PLATELET # BLD AUTO: 201 10E9/L (ref 150–450)
RBC # BLD AUTO: 4.4 10E12/L (ref 4.4–5.9)
WBC # BLD AUTO: 13.3 10E9/L (ref 4–11)

## 2017-12-06 PROCEDURE — 25000128 H RX IP 250 OP 636: Performed by: NEUROLOGICAL SURGERY

## 2017-12-06 PROCEDURE — 40000193 ZZH STATISTIC PT WARD VISIT

## 2017-12-06 PROCEDURE — 85027 COMPLETE CBC AUTOMATED: CPT | Performed by: INTERNAL MEDICINE

## 2017-12-06 PROCEDURE — 25000131 ZZH RX MED GY IP 250 OP 636 PS 637: Performed by: INTERNAL MEDICINE

## 2017-12-06 PROCEDURE — 25000132 ZZH RX MED GY IP 250 OP 250 PS 637: Performed by: NEUROLOGICAL SURGERY

## 2017-12-06 PROCEDURE — 82565 ASSAY OF CREATININE: CPT | Performed by: INTERNAL MEDICINE

## 2017-12-06 PROCEDURE — 00000146 ZZHCL STATISTIC GLUCOSE BY METER IP

## 2017-12-06 PROCEDURE — 36415 COLL VENOUS BLD VENIPUNCTURE: CPT | Performed by: INTERNAL MEDICINE

## 2017-12-06 PROCEDURE — 97161 PT EVAL LOW COMPLEX 20 MIN: CPT | Mod: GP

## 2017-12-06 PROCEDURE — 25000132 ZZH RX MED GY IP 250 OP 250 PS 637: Performed by: INTERNAL MEDICINE

## 2017-12-06 RX ORDER — DIAZEPAM 5 MG
5 TABLET ORAL EVERY 6 HOURS PRN
Qty: 60 TABLET | Refills: 0 | Status: SHIPPED | OUTPATIENT
Start: 2017-12-06 | End: 2018-02-27

## 2017-12-06 RX ORDER — HYDROMORPHONE HYDROCHLORIDE 2 MG/1
2-4 TABLET ORAL
Qty: 75 TABLET | Refills: 0 | Status: SHIPPED | OUTPATIENT
Start: 2017-12-06 | End: 2017-12-12 | Stop reason: SINTOL

## 2017-12-06 RX ADMIN — CEFAZOLIN SODIUM 2 G: 2 INJECTION, SOLUTION INTRAVENOUS at 00:26

## 2017-12-06 RX ADMIN — ACETAMINOPHEN 975 MG: 325 TABLET, FILM COATED ORAL at 08:12

## 2017-12-06 RX ADMIN — ACETAMINOPHEN 975 MG: 325 TABLET, FILM COATED ORAL at 00:26

## 2017-12-06 RX ADMIN — CITALOPRAM HYDROBROMIDE 20 MG: 10 TABLET ORAL at 08:12

## 2017-12-06 RX ADMIN — POTASSIUM CHLORIDE AND SODIUM CHLORIDE: 900; 150 INJECTION, SOLUTION INTRAVENOUS at 02:04

## 2017-12-06 RX ADMIN — METFORMIN HYDROCHLORIDE 1000 MG: 500 TABLET ORAL at 08:13

## 2017-12-06 RX ADMIN — BACLOFEN 10 MG: 10 TABLET ORAL at 08:12

## 2017-12-06 RX ADMIN — LISINOPRIL 10 MG: 10 TABLET ORAL at 08:13

## 2017-12-06 RX ADMIN — INSULIN ASPART 3 UNITS: 100 INJECTION, SOLUTION INTRAVENOUS; SUBCUTANEOUS at 09:40

## 2017-12-06 RX ADMIN — HYDROMORPHONE HYDROCHLORIDE 4 MG: 2 TABLET ORAL at 02:04

## 2017-12-06 RX ADMIN — INSULIN GLARGINE 50 UNITS: 100 INJECTION, SOLUTION SUBCUTANEOUS at 08:17

## 2017-12-06 RX ADMIN — HYDROMORPHONE HYDROCHLORIDE 4 MG: 2 TABLET ORAL at 04:56

## 2017-12-06 RX ADMIN — HYDROMORPHONE HYDROCHLORIDE 4 MG: 2 TABLET ORAL at 08:13

## 2017-12-06 NOTE — DISCHARGE INSTRUCTIONS
While on narcotic pain medication, to prevent constipation:  1. Drink plenty of water to keep well hydrated   2. May take over the counter Colace or Senna (follow instructions on label)    Call your physician if you experience:  1. Fever greater than 100 degrees with body chills or excessive sweating.  2. Increased redness, localized warmth, tenderness, drainage or swelling at incision site.  Opening or pulling apart of incision site.   3. Pain not controlled with oral pain medications, ice and rest.   4. No bowel movement in 3 days (may use Milk of Magnesia or other over the counter remedy).  5. Chest pain, shortness of breath, and/or calf pain with excessive swelling.  6. Generalized feeling of illness.  7. Any other questions or concerns related to your surgery/recovery.      Thank you for allowing [facility] to participate in your cares!!

## 2017-12-06 NOTE — PLAN OF CARE
Problem: Patient Care Overview  Goal: Plan of Care/Patient Progress Review  Outcome: Improving  Pt. Stand by assist with neck collar. Steady on feet. Capnography WNL throughout shift. PO dilaudid given to keep pain generally below 3/10. Refused compression sleeves and requested that capnography be eliminated due to bi-pap machine, in which was not compatible with the nasal cannula. Oxygen saturation obtained through vitals machine. MARIA L removed mid shift, 10 mL output.

## 2017-12-06 NOTE — PLAN OF CARE
Problem: Patient Care Overview  Goal: Plan of Care/Patient Progress Review  PT: Patient seen by physical therapy for evaluation.  Patient with PMH including IDDM type 2, HTN, obesity, GERALD on bipap, HLD, and chronic neck pain POD#1 s/p C5-6 discectomy with placement of interbody graft and anterior plate secondary to severe C5-6 stenosis.  Patient lives alone in a 3rd story apartment (no elevator present).  At baseline, patient reported that he does not use an assistive device.  Patient did report that he has fallen x5 within the last few weeks secondary to decreased coordination with walking prompting intervention.    Discharge Planner PT   Patient plan for discharge: home  Current status: Patient is independent with bed mobility.  Patient able to verbalize spinal precautions with minimal cueing.  Patient amb 300 feet independently with patient reporting improved coordination and no loss of balance noted.  Patient negotiated 2 flights of 16 steps with single handrail and SBA.  Patient did state that he was planning on driving home independently from [place] (where brother lives), however both RN and therapist indicated that he should not be driving on pain medications, patient reported that he would have his daughter pick him up.  Barriers to return to prior living situation:none  Recommendations for discharge: home  Rationale for recommendations: Patient independent with all mobility.       Entered by: Anna Soliz 12/06/2017 8:53 AM    Physical Therapy Discharge Summary    Reason for therapy discharge:    Evaluation only, no further physical therapy needs at this time    Progress towards therapy goal(s). See goals on Care Plan in [facility] electronic health record for goal details.  NA, evaluation only    Therapy recommendation(s):    No further therapy is recommended.

## 2017-12-06 NOTE — PLAN OF CARE
End of Shift Summary.  For vital signs and complete assessments, please see documentation flowsheets.     Pertinent assessments: pt alert and oriented, up with minimal assistance. Pt with soft c-collar when up. Anterior neck dressing dry and intact, MARIA L drain removed last evening. Pt taking 4 mg of oral dilaudid with good pain relief. Pt voiding without difficulty. Patient refusing capnography, has his home bipap on.  Major Shift Events: none  Plan (Upcoming Events): PT/OT  Discharge/Transfer Needs: home later today    Bedside Shift Report Completed :   Bedside Safety Check Completed:

## 2017-12-06 NOTE — UTILIZATION REVIEW
"Rhona Ashton  Admission Status; Secondary Review Determination     Admission Date: 12/5/2017  5:38 AM       Under the authority of the Utilization Management Committee, the utilization review process indicated a secondary review on the above patient.  The review outcome is based on review of the medical records, discussions with staff, and applying clinical experience noted on the date of the review.        ()      Inpatient Status Appropriate - This patient's medical care is consistent with medical management for inpatient care and reasonable inpatient medical practice.      () Observation Status Appropriate - This patient does not meet hospital inpatient criteria and is placed in observation status. If this patient's primary payer is Medicare and was admitted as an inpatient, Condition Code 44 should be used and patient status changed to \"observation\".   () Admission Status NOT Appropriate - This patient's medical care is not consistent with medical management for Inpatient or Observation Status.        (X) Outpatient Procedure Status Appropriate - Procedure not on Medicare Inpatient list and no complications at the time of this review       RATIONALE FOR DETERMINATION      Brief clinical presentation, information copied from the chart, abbreviated and edited for relevant content:     12/6/2017 9:53 AM (CT) Carla Aggarwal: Paged the attending to change the order ASAP to outpatient. There already is a discharge order in the chart so the team does not anticipate a 2 night stay that would warrant inpatient.[facility] have determined this procedure not to be on the CMS inpatient only list, so an outpatient order would be the correct admission order. At the time of the note, the order has not yet been changed. [facility] has been notified.                 The information on this document is developed by the utilization review team in order for the business office to ensure compliance.  This only denotes the appropriateness of " proper admission status and does not reflect the quality of care rendered.         The definitions of Inpatient Status and Observation Status used in making the determination above are those provided in the CMS Coverage Manual, Chapter 1 and Chapter 6, section 70.4.      Sincerely,      Carla Aggarwal MD   Utilization Review/ Case Management  [facility].

## 2017-12-06 NOTE — PROGRESS NOTES
Reviewed discharge instructions and medications with patient and brother Pat. Questions answered. Patient discharged to home with brother Pat, discharge instructions, medications (Dilaudid, Valium), and belongings.

## 2017-12-06 NOTE — PLAN OF CARE
Problem: Patient Care Overview  Goal: Plan of Care/Patient Progress Review  OT: Order received for IP OT consult currently post-op cervical.  Per discussion with PT, no current OT needs, will complete order.

## 2017-12-06 NOTE — PROGRESS NOTES
12/06/17 0822   Quick Adds   Type of Visit Initial PT Evaluation   Living Environment   Lives With alone   Living Arrangements apartment   Home Accessibility stairs to enter home   Number of Stairs to Enter Home (3 flights of 16 steps)   Number of Stairs Within Home 0   Stair Railings at Home outside, present on right side   Transportation Available car;family or friend will provide   Living Environment Comment Lives in 3rd floor apartment, no elevator   Self-Care   Usual Activity Tolerance good   Current Activity Tolerance good   Regular Exercise no   Equipment Currently Used at Home none   Activity/Exercise/Self-Care Comment Patient regularly walks 3 flights of stairs to enter apartment, works part time delivering cookies from the cookie cart   Functional Level Prior   Ambulation 0-->independent   Transferring 0-->independent   Toileting 0-->independent   Bathing 0-->independent   Dressing 0-->independent   Eating 0-->independent   Communication 0-->understands/communicates without difficulty   Swallowing 0-->swallows foods/liquids without difficulty   Cognition 0 - no cognition issues reported   Fall history within last six months yes   Number of times patient has fallen within last six months 5   Which of the above functional risks had a recent onset or change? ambulation   Prior Functional Level Comment Patient reports falls secondary to pain at cervical spine, now improved following surgery   General Information   Onset of Illness/Injury or Date of Surgery - Date 12/05/17   Referring Physician Boo Butterfield MD   Patient/Family Goals Statement return to home   Pertinent History of Current Problem (include personal factors and/or comorbidities that impact the POC) Patient with PMH including IDDM type 2, HTN, obesity, GERALD on bipap, HLD, and chronic neck pain POD#1 s/p C5-6 discectomy with placement of interbody graft and anterior plate secondary to severe C5-6 stenosis.     Precautions/Limitations  "fall precautions;spinal precautions   Cognitive Status Examination   Orientation orientation to person, place and time   Personal Safety and Judgment intact   Memory intact   Pain Assessment   Patient Currently in Pain No   Integumentary/Edema   Integumentary/Edema Comments mild edema at anterior spine   Posture    Posture Not impaired   Range of Motion (ROM)   ROM Comment WFL, limited cervical rotation appropriate    Strength   Strength Comments WFL   Bed Mobility   Bed Mobility Comments Independent   Transfer Skills   Transfer Comments Independent   Gait   Gait Comments Amb 300 feet independently with no LOB, patient negotiated 2 flights of 16 steps with single handrail independently.   Balance   Balance Comments no LOB noted, patient reported feeling improved coordination   Sensory Examination   Sensory Perception no deficits were identified   Coordination   Coordination Comments Patient with decreased coordination prior to surgery, reports improved coordination following surgery   Muscle Tone   Muscle Tone no deficits were identified   Clinical Impression   Criteria for Skilled Therapeutic Intervention no;evaluation only   Clinical Presentation Stable/Uncomplicated   Clinical Presentation Rationale clinical judgement   Clinical Decision Making (Complexity) Low complexity   Therapy Frequency` (1x visit only)   Predicted Duration of Therapy Intervention (days/wks) 1x visit only   Anticipated Discharge Disposition Home   Risk & Benefits of therapy have been explained Yes   Patient, Family & other staff in agreement with plan of care Yes   [facility] [employer] TM \"6 Clicks\"   2016, Trustees of [facility], under license to [employer].  All rights reserved.   6 Clicks Short Forms Basic Mobility Inpatient Short Form   [facility] AM-PAC  \"6 Clicks\" V.2 Basic Mobility Inpatient Short Form   1. Turning from your back to your side while in a flat bed without using bedrails? 4 - None   2. Moving from " lying on your back to sitting on the side of a flat bed without using bedrails? 4 - None   3. Moving to and from a bed to a chair (including a wheelchair)? 4 - None   4. Standing up from a chair using your arms (e.g., wheelchair, or bedside chair)? 4 - None   5. To walk in hospital room? 4 - None   6. Climbing 3-5 steps with a railing? 4 - None   Basic Mobility Raw Score (Score out of 24.Lower scores equate to lower levels of function) 24   Total Evaluation Time   Total Evaluation Time (Minutes) 20

## 2017-12-06 NOTE — DISCHARGE SUMMARY
[facility]    Discharge Summary  Neurosurgery    Date of Admission:  12/5/2017  Date of Discharge:  12/6/2017  Discharging Provider: Marielena Ferreira  Date of Service (when I saw the patient): 12/06/17    Discharge Diagnoses   Active Problems:    S/P cervical spinal fusion      History of Present Illness   Tony S Aschoff is an 57 year old male who presented with cervical radicular pain. He was found to have C5-6 severe stenosis with myelomalacia and cervical myelopathy. He underwent a C5-6 ACDF with Dr. Boo Butterfield on 12-5-2017. Today pt is sitting up in the chair noting good pain relief with oral opioids. He only reports incisional pain. He notes that his radicular pain has resolved and he is ready to DC home.      Hospital Course   Tony S Aschoff was admitted on 12/5/2017.  The following problems were addressed during his hospitalization:    Active Problems:    S/P cervical spinal fusion    Assessment: stable    Plan: Dc home        I have discussed the following assessment and plan with Dr. Boo Butterfield  who is in agreement with initial plan and will follow up with further consultation recommendations.    Marielena Ferreira [facility]  [facility]  [place] [address]    Tel [phone]  Pager [phone]        Significant Results and Procedures   Post C5-6ACDF    Pending Results     Unresulted Labs Ordered in the Past 30 Days of this Admission     No orders found for last 61 day(s).          Code Status   Full Code    Primary Care Physician   [facility]    Physical Exam   Temp: 97.2  F (36.2  C) Temp src: Oral BP: 122/66   Heart Rate: 72 Resp: 16 SpO2: 99 % O2 Device: None (Room air) Oxygen Delivery: 3 LPM  There were no vitals filed for this visit.  Vital Signs with Ranges  Temp:  [97.2  F (36.2  C)-99.2  F (37.3  C)] 97.2  F (36.2  C)  Heart Rate:  [69-94] 72  Resp:  [10-23] 16  BP: (115-173)/(60-99)  122/66  SpO2:  [91 %-100 %] 99 %  I/O last 3 completed shifts:  In: 4743.5 [P.O.:1800; I.V.:2943.5]  Out: 3185 [Urine:3140; Drains:30; Blood:15]    Constitutional: Awake, alert, cooperative, no apparent distress, and appears stated age.  Eyes: Lids and lashes normal, pupils equal, round and reactive to light, extra ocular muscles intact  ENT: Normocephalic, without obvious abnormality, atraumatic  Respiratory: No increased work of breathing  Skin: No bruising or bleeding, normal skin color, texture, turgor, no redness, warmth, or swelling.  Incision with dressing intact.   Musculoskeletal: There is no redness, warmth, or swelling of the joints.  Full range of motion noted.  Motor strength is 5 out of 5 all extremities bilaterally.  Tone is normal.  Neurologic: Awake, alert, oriented to name, place and time.  Cranial nerves II-XII are grossly intact.  Motor is 5 out of 5 bilaterally.     Neuropsychiatric: Calm, normal eye contact, alert, normal affect, oriented to self, place, time and situation, memory for past and recent events intact and thought process normal.       Time Spent on this Encounter   I, Marielena Ferreira, personally saw the patient today and spent greater than 30 minutes discharging this patient.    Discharge Disposition   Discharged to home  Condition at discharge: Stable    Consultations This Hospital Stay   OCCUPATIONAL THERAPY ADULT IP CONSULT  PHYSICAL THERAPY ADULT IP CONSULT  HOSPITALIST IP CONSULT    Discharge Orders     XR Cervical Spine 2/3 Views     Follow-up and recommended labs and tests    Please follow up at the [facility] in 6 weeks for follow up with xray prior.  Please call the clinic at [phone] to schedule your appointment with Marielena Ferreira CNP or Abeba Hamilton CNP     Reason for your hospital stay   You were in the hospital for a C5-6 ACDF     Activity   Your activity upon discharge: Discharge instructions:  No lifting of more than 10 pounds, no bending, no  twisting until follow up visit.  Wear brace when out of bed as needed for 2 weeks     Ok to shampoo hair, shower or bathe, but, do not scrub or submerge incision under water until evaluated post-operatively in clinic.    Ok to walk as tolerated, avoid bed rest and prolonged sitting.    No contact sports until after follow up visit  No high impact activities such as; running/jogging, snowmobile or 4 culver riding or any other recreational vehicles.    Do not take NSAIDS (ibuprofen, advil, aleve, naproxen, etc) for pain control.    Do NOT drive while taking narcotic pain medication.    Call the [facility] at [phone] for increasing redness, swelling or pus draining from the incision, increased pain or any other questions and concerns.     Wound care and dressings   Instructions to care for your wound at home: Keep your incision clean and dry at all times.  OK to remove dressing on postop day 2.  OK to shower on postop day 3 and allow water to run over incision, pat dry after shower.  No bathing swimming or submerging in water.  Call immediately or come to ED if any drainage occurs, or you develop new pain, redness, or swelling.     Full Code     Diet   Follow this diet upon discharge: regular diet       Discharge Medications   Current Discharge Medication List      START taking these medications    Details   HYDROmorphone (DILAUDID) 2 MG tablet Take 1-2 tablets (2-4 mg) by mouth every 3 hours as needed for moderate to severe pain  Qty: 75 tablet, Refills: 0    Associated Diagnoses: S/P cervical spinal fusion      diazepam (VALIUM) 5 MG tablet Take 1 tablet (5 mg) by mouth every 6 hours as needed for other (muscle spasms)  Qty: 60 tablet, Refills: 0    Associated Diagnoses: S/P cervical spinal fusion         CONTINUE these medications which have NOT CHANGED    Details   dulaglutide (TRULICITY) 1.5 MG/0.5ML pen Inject 1.5 mg Subcutaneous every 7 days       BASAGLAR 100 UNIT/ML injection Inject 50 Units  Subcutaneous 2 times daily      insulin aspart (NOVOLOG FLEXPEN) 100 UNIT/ML injection Inject 30 Units Subcutaneous 3 times daily (with meals)      ATORVASTATIN CALCIUM PO Take 80 mg by mouth daily       LISINOPRIL PO Take 10 mg by mouth daily      citalopram (CELEXA) 10 MG/5ML solution Take 20 mg by mouth daily      BACLOFEN PO Take 10 mg by mouth 3 times daily      METFORMIN HCL PO Take 500 mg by mouth 2 times daily (with meals) Take 2 tablets twice daily         STOP taking these medications       HYDROcodone-acetaminophen (NORCO) 7.5-325 MG per tablet Comments:   Reason for Stopping:             Allergies   Allergies   Allergen Reactions     Adhesive Tape Rash

## 2017-12-07 LAB — INTERPRETATION ECG - MUSE: NORMAL

## 2017-12-12 ENCOUNTER — TELEPHONE (OUTPATIENT)
Dept: NEUROSURGERY | Facility: CLINIC | Age: 57
End: 2017-12-12

## 2017-12-12 DIAGNOSIS — Z98.1 S/P CERVICAL SPINAL FUSION: Primary | ICD-10-CM

## 2017-12-12 RX ORDER — HYDROCODONE BITARTRATE AND ACETAMINOPHEN 5; 325 MG/1; MG/1
1-2 TABLET ORAL EVERY 4 HOURS PRN
Qty: 75 TABLET | Refills: 0 | Status: SHIPPED | OUTPATIENT
Start: 2017-12-12 | End: 2018-01-16

## 2017-12-12 NOTE — TELEPHONE ENCOUNTER
REASON FOR CALL:  Pt would like to be put on a different pain medication. States that dilaudid is too strong and he can't handle it. Please call pt back.     Detailed message can be left:  YES

## 2017-12-12 NOTE — TELEPHONE ENCOUNTER
Patient states dilaudid too strong and would like to be switched to something else. Spoke with Abeba Hamilton CNP and ok to switch to Norco. Patient unable to get a ride to  in [place] today but is able to  tomorrow. Patient will bring remaining dilaudid to his pharmacy to be disposed of.

## 2017-12-15 ENCOUNTER — TELEPHONE (OUTPATIENT)
Dept: NEUROSURGERY | Facility: CLINIC | Age: 57
End: 2017-12-15

## 2017-12-15 NOTE — TELEPHONE ENCOUNTER
Spoke to Reggie regarding symptoms.  He states that he has had symptoms of vertigo prior to surgery.  He reports that about 1-2 months ago he sustained a fall and head injury - had some staples to his head.   He did not specify further what his diagnosis was at that time.  He states he was being followed elsewhere for the vertigo.  He denies fever, HA or any other symptoms at this time.  I explained that symptoms are likely exacerbated by his current use of pain medication and valium.  I did suggest that he contact the provider that he followed with previously for vertigo to notify them.  He will explain his current post operative status, restrictions and medication use at this time to that provider.  Patient is requesting a letter for his employer with current restrictions.  This will be mailed to his more per his request.  He will let us know if symptoms worsen or persist.

## 2017-12-15 NOTE — LETTER
12/15/2017     Tony S Aschoff  [id number]  [place] [address]    To whom it may concern,     Reggie underwent surgical procedure on 12/5/17.  Current restrictions are as follows.  Post operative activity limitations: 6-8 weeks, no lifting > 10 pounds, no bending, twisting, or overhead reaching.  He is not allowed to drive while taking narcotic medications.  This will be re-evaluated at his 6 week post surgical follow up scheduled for 1/16/18      Sincerely,    Dr. Boo Butterfield  [facility]  [address] [place] [id number]  Phone: [phone]

## 2017-12-15 NOTE — TELEPHONE ENCOUNTER
REASON FOR CALL:  Pt states he has developed vertigo and wondering if this has to do with his surgery. Says it's very bad at times. Please call him back.    Detailed message can be left:  YES

## 2018-01-16 ENCOUNTER — RADIANT APPOINTMENT (OUTPATIENT)
Dept: GENERAL RADIOLOGY | Facility: CLINIC | Age: 58
End: 2018-01-16
Attending: NURSE PRACTITIONER
Payer: COMMERCIAL

## 2018-01-16 ENCOUNTER — OFFICE VISIT (OUTPATIENT)
Dept: NEUROSURGERY | Facility: CLINIC | Age: 58
End: 2018-01-16
Payer: COMMERCIAL

## 2018-01-16 VITALS
RESPIRATION RATE: 18 BRPM | HEIGHT: 70 IN | HEART RATE: 90 BPM | DIASTOLIC BLOOD PRESSURE: 81 MMHG | BODY MASS INDEX: 37.59 KG/M2 | SYSTOLIC BLOOD PRESSURE: 125 MMHG | OXYGEN SATURATION: 94 % | WEIGHT: 262.6 LBS

## 2018-01-16 DIAGNOSIS — Z98.1 S/P CERVICAL SPINAL FUSION: ICD-10-CM

## 2018-01-16 DIAGNOSIS — Z98.1 S/P CERVICAL SPINAL FUSION: Primary | ICD-10-CM

## 2018-01-16 PROCEDURE — 72040 X-RAY EXAM NECK SPINE 2-3 VW: CPT

## 2018-01-16 PROCEDURE — 99024 POSTOP FOLLOW-UP VISIT: CPT | Performed by: NURSE PRACTITIONER

## 2018-01-16 RX ORDER — HYDROCODONE BITARTRATE AND ACETAMINOPHEN 5; 325 MG/1; MG/1
1 TABLET ORAL EVERY 6 HOURS PRN
Qty: 50 TABLET | Refills: 0 | Status: SHIPPED | OUTPATIENT
Start: 2018-01-16 | End: 2018-02-27

## 2018-01-16 ASSESSMENT — PAIN SCALES - GENERAL: PAINLEVEL: MILD PAIN (2)

## 2018-01-16 NOTE — LETTER
"    1/16/2018         RE: Tony S Aschoff  [address]  [facility] [address]        Dear Colleague,    Thank you for referring your patient, Tony S Aschoff, to the [facility]. Please see a copy of my visit note below.    [facility]  Neurosurgery followup:    HPI: Tony Aschoff is a 57 year old with a history of cervical myelopathy.  He underwent C5-6 discectomy and fusion with Dr. Boo nicole on 12/5/17.  He is here for his 6 week follow up appointment.  Today he states he has \"the flu\" and has been sick for the past 10 days.  As far as his surgical pain he notes it is improving and is taking Norco up to BID only.  He describes intermittent throbbing pain in the bicep region bilaterally.  He states he is able to walk better and no issues with balance since his surgery.  He denies weakness to BLE or changes to bowel or bladder.     Exam:  Constitutional:  Alert, well nourished, NAD.  HEENT: Normocephalic, atraumatic.   Pulm:  Without shortness of breath   CV:  No pitting edema of BLE.     Neurological:  Awake  Alert  Oriented x 3  Motor exam:     Shoulder Abduction:  Right:  5/5    Left:  5/5  Biceps:                      Right:  5/5    Left:  5/5  Triceps:                     Right:  5/5    Left:  5/5  Wrist Extensors:       Right:  5/5    Left:  5/5  Wrist Flexors:           Right:  5/5    Left:  5/5  Intrinsics:                  Right:  5/5    Left:  5/5     Able to spontaneously move U/E bilaterally  Sensation intact throughout all U/E dermatomes    Ambulates independently.  Steady gait.  Non-antalgic gait.  Non-myelopathic gait.    Incision: Clean, latisha and intact.  Images: Per Xray fusion intact and Stable.    A/P:   Tony Aschoff is a 57 year old with a history of cervical myelopathy.  He underwent C5-6 discectomy and fusion with Dr. Boo nicole on 12/5/17.  He is here for his 6 week follow up appointment.  Today he states he has \"the flu\" and has been sick for " the past 10 days.  As far as his surgical pain he notes it is improving and is taking Norco up to BID only.  He describes intermittent throbbing pain in the bicep region bilaterally.  He states he is able to walk better and no issues with balance since his surgery.  He denies weakness to BLE or changes to bowel or bladder.  We reviewed Xrays today.  He was given a prescription refill for Norco, max 2/d.   We discussed decreasing over the next couple of weeks.  He is agreeable.  He states he is ready to return to work, therefore, a letter was written for return next week with restrictions.       Patient Instructions   - May increase lifting restriction to 20  pounds    - Followup in 6 weeks  with xray prior     - Call the clinic at [phone] for increasing redness, swelling or pus draining from the incision, increased pain or any other questions and concerns.        Abeba Hamilton CNP  [facility]  [address] [address]    Tel [phone]  Pager [phone]      Again, thank you for allowing me to participate in the care of your patient.        Sincerely,        Abeba Hamilton, WILL

## 2018-01-16 NOTE — PATIENT INSTRUCTIONS
- May increase lifting restriction to 20  pounds    - Followup in 6 weeks  with xray prior     - Call the clinic at [phone] for increasing redness, swelling or pus draining from the incision, increased pain or any other questions and concerns.

## 2018-01-16 NOTE — LETTER
[facility]   Spine and Brain Clinic  [address] [place] [address]  [address]    January 16, 2018      To Whom it May Concern,          Tony S Aschoff was seen in clinic on 1/16/18.  He may return to work on 1/22/2018 with the following restrictions:      Restrictions:    - No lifting greater than 20 pounds.  - No repetitive overhead reaching  - 5 minute hourly stretch breaks     - Will reassess at next appointment in 6 weeks.             Sincerely,              Abeba Hamilton CNP  [facility]  [address] [address]

## 2018-01-16 NOTE — LETTER
[facility]   Spine and Brain Clinic  [address] [place] [address]  [address]    January 16, 2018      To Whom it May Concern,          Tony S Aschoff was seen in clinic on 1/16/18.  He may return to work on with the following restrictions:      Restrictions:    - No lifting greater than 20 pounds.  - No repetitive overhead reaching  - 5 minute hourly stretch breaks     - Will reassess at next appointment in 6 weeks.             Sincerely,              Abeba Hamilton CNP  [facility]  [address] [address]    Tel [phone]

## 2018-01-16 NOTE — NURSING NOTE
"Tony S Aschoff is a 57 year old male who presents for:  Chief Complaint   Patient presents with     Surgical Followup     S/p C5-6 ACDF. DOS 12/5/17. Xray today. Neck is feeling real good. He is having achiness in his shoulders and upper arms. Denies any N/T in the arms. His legs feel strong again. Now he has pain in the lower back.         Initial Vitals:  /81  Pulse 90  Resp 18  Ht 5' 10\" (1.778 m)  Wt 262 lb 9.6 oz (119.1 kg)  SpO2 94%  BMI 37.68 kg/m2 Estimated body mass index is 37.68 kg/(m^2) as calculated from the following:    Height as of this encounter: 5' 10\" (1.778 m).    Weight as of this encounter: 262 lb 9.6 oz (119.1 kg).. Body surface area is 2.43 meters squared. BP completed using cuff size: large  Mild Pain (2)    Do you feel safe in your environment?  Yes  Do you need any refills today? Yes, norco     Nursing Comments: S/p C5-6 ACDF. DOS 12/5/17. Xray today. Neck is feeling real good. He is having achiness in his shoulders and upper arms. Denies any N/T in the arms. His legs feel strong again. Now he has pain in the lower back. The only trouble he has is when he sleeps. He has stiffness in his neck and then when he gets up he will get a little vertigo.         Ashley Stuart    "

## 2018-01-16 NOTE — MR AVS SNAPSHOT
"              After Visit Summary   1/16/2018    Tony S Aschoff    MRN: [id number]           Patient Information     Date Of Birth          1960        Visit Information        Provider Department      1/16/2018 11:20 AM Abeba Hamilton NP [facility]        Today's Diagnoses     S/P cervical spinal fusion    -  1      Care Instructions    - May increase lifting restriction to 20  pounds    - Followup in 6 weeks  with xray prior     - Call the clinic at [phone] for increasing redness, swelling or pus draining from the incision, increased pain or any other questions and concerns.              Follow-ups after your visit        Future tests that were ordered for you today     Open Future Orders        Priority Expected Expires Ordered    XR Cervical Spine 2/3 Views Routine 2/28/2018 1/16/2019 1/16/2018            Who to contact     If you have questions or need follow up information about today's clinic visit or your schedule please contact [facility] directly at [phone].  Normal or non-critical lab and imaging results will be communicated to you by Vtrimhart, letter or phone within 4 business days after the clinic has received the results. If you do not hear from us within 7 days, please contact the clinic through Traxiant or phone. If you have a critical or abnormal lab result, we will notify you by phone as soon as possible.  Submit refill requests through Instant AV or call your pharmacy and they will forward the refill request to us. Please allow 3 business days for your refill to be completed.          Additional Information About Your Visit        VtrimharLancope Information     Instant AV lets you send messages to your doctor, view your test results, renew your prescriptions, schedule appointments and more. To sign up, go to [email or web address] . Click on \"Log in\" on the left side of the screen, which will take you to the Welcome page. Then click on \"Sign up Now\" on the " "right side of the page.     You will be asked to enter the access code listed below, as well as some personal information. Please follow the directions to create your username and password.     Your access code is: [email or web address]  Expires: 2018  9:18 AM     Your access code will  in 90 days. If you need help or a new code, please call your [facility] or [phone].        Care EveryWhere ID     This is your Care EveryWhere ID. This could be used by other organizations to access your [place] medical records  [id number]        Your Vitals Were     Pulse Respirations Height Pulse Oximetry BMI (Body Mass Index)       90 18 5' 10\" (1.778 m) 94% 37.68 kg/m2        Blood Pressure from Last 3 Encounters:   18 125/81   17 122/66   17 141/81    Weight from Last 3 Encounters:   18 262 lb 9.6 oz (119.1 kg)   17 271 lb 12.8 oz (123.3 kg)   17 272 lb (123.4 kg)                 Today's Medication Changes          These changes are accurate as of: 18 12:13 PM.  If you have any questions, ask your nurse or doctor.               These medicines have changed or have updated prescriptions.        Dose/Directions    HYDROcodone-acetaminophen 5-325 MG per tablet   Commonly known as:  NORCO   This may have changed:    - how much to take  - when to take this  - reasons to take this   Used for:  S/P cervical spinal fusion   Changed by:  Abeba Hamilton, NP        Dose:  1 tablet   Take 1 tablet by mouth every 6 hours as needed for moderate to severe pain (Max of 2 tablets daily prn.)   Quantity:  50 tablet   Refills:  0            Where to get your medicines      Some of these will need a paper prescription and others can be bought over the counter.  Ask your nurse if you have questions.     Bring a paper prescription for each of these medications     HYDROcodone-acetaminophen 5-325 MG per tablet                Primary Care Provider Office Phone # Fax #    [facility] [phone]       [address] [facility] [place] [address]        Equal Access to Services     MICHELET TAYLOR : Hadii aad ku hadneeta Sorivera, wacassida luqadaha, qalewista kaalmada norma, mary jane joe lorenazeke ruano ken smith. So [facility] [phone].    ATENCIÓN: Si habla español, tiene a fernández disposición servicios gratuitos de asistencia lingüística. Llame al [phone].    We comply with applicable federal civil rights laws and [place] laws. We do not discriminate on the basis of race, color, national origin, age, disability, sex, sexual orientation, or gender identity.            Thank you!     Thank you for choosing [facility]  for your care. Our goal is always to provide you with excellent care. Hearing back from our patients is one way we can continue to improve our services. Please take a few minutes to complete the written survey that you may receive in the mail after your visit with us. Thank you!             Your Updated Medication List - Protect others around you: Learn how to safely use, store and throw away your medicines at www.disposemymeds.org.          This list is accurate as of: 1/16/18 12:13 PM.  Always use your most recent med list.                   Brand Name Dispense Instructions for use Diagnosis    ATORVASTATIN CALCIUM PO      Take 80 mg by mouth daily        BACLOFEN PO      Take 10 mg by mouth 3 times daily        BASAGLAR 100 UNIT/ML injection      Inject 50 Units Subcutaneous 2 times daily        citalopram 10 MG/5ML solution    celeXA     Take 20 mg by mouth daily        diazepam 5 MG tablet    VALIUM    60 tablet    Take 1 tablet (5 mg) by mouth every 6 hours as needed for other (muscle spasms)    S/P cervical spinal fusion       dulaglutide 1.5 MG/0.5ML pen    TRULICITY     Inject 1.5 mg Subcutaneous every 7 days        HYDROcodone-acetaminophen 5-325 MG per tablet    NORCO    50 tablet    Take 1 tablet by mouth every 6 hours as  needed for moderate to severe pain (Max of 2 tablets daily prn.)    S/P cervical spinal fusion       LISINOPRIL PO      Take 10 mg by mouth daily        METFORMIN HCL PO      Take 500 mg by mouth 2 times daily (with meals) Take 2 tablets twice daily        NovoLOG FLEXPEN 100 UNIT/ML injection   Generic drug:  insulin aspart      Inject 30 Units Subcutaneous 3 times daily (with meals)

## 2018-01-16 NOTE — PROGRESS NOTES
"[facility]  Neurosurgery followup:    HPI: Tony Aschoff is a 57 year old with a history of cervical myelopathy.  He underwent C5-6 discectomy and fusion with Dr. Boo nicole on 12/5/17.  He is here for his 6 week follow up appointment.  Today he states he has \"the flu\" and has been sick for the past 10 days.  As far as his surgical pain he notes it is improving and is taking Norco up to BID only.  He describes intermittent throbbing pain in the bicep region bilaterally.  He states he is able to walk better and no issues with balance since his surgery.  He denies weakness to BLE or changes to bowel or bladder.     Exam:  Constitutional:  Alert, well nourished, NAD.  HEENT: Normocephalic, atraumatic.   Pulm:  Without shortness of breath   CV:  No pitting edema of BLE.     Neurological:  Awake  Alert  Oriented x 3  Motor exam:     Shoulder Abduction:  Right:  5/5    Left:  5/5  Biceps:                      Right:  5/5    Left:  5/5  Triceps:                     Right:  5/5    Left:  5/5  Wrist Extensors:       Right:  5/5    Left:  5/5  Wrist Flexors:           Right:  5/5    Left:  5/5  Intrinsics:                  Right:  5/5    Left:  5/5     Able to spontaneously move U/E bilaterally  Sensation intact throughout all U/E dermatomes    Ambulates independently.  Steady gait.  Non-antalgic gait.  Non-myelopathic gait.    Incision: Clean, latisha and intact.  Images: Per Xray fusion intact and Stable.    A/P:   Tony Aschoff is a 57 year old with a history of cervical myelopathy.  He underwent C5-6 discectomy and fusion with Dr. Boo nicole on 12/5/17.  He is here for his 6 week follow up appointment.  Today he states he has \"the flu\" and has been sick for the past 10 days.  As far as his surgical pain he notes it is improving and is taking Norco up to BID only.  He describes intermittent throbbing pain in the bicep region bilaterally.  He states he is able to walk better and no issues with balance " since his surgery.  He denies weakness to BLE or changes to bowel or bladder.  We reviewed Xrays today.  He was given a prescription refill for Norco, max 2/d.   We discussed decreasing over the next couple of weeks.  He is agreeable.  He states he is ready to return to work, therefore, a letter was written for return next week with restrictions.       Patient Instructions   - May increase lifting restriction to 20  pounds    - Followup in 6 weeks  with xray prior     - Call the clinic at [phone] for increasing redness, swelling or pus draining from the incision, increased pain or any other questions and concerns.        Abeba Hamilton [facility]  Spine and Brain Clinic  [address] [address]    Tel [phone]  Pager [phone]

## 2018-02-27 ENCOUNTER — RADIANT APPOINTMENT (OUTPATIENT)
Dept: GENERAL RADIOLOGY | Facility: CLINIC | Age: 58
End: 2018-02-27
Attending: NURSE PRACTITIONER
Payer: COMMERCIAL

## 2018-02-27 ENCOUNTER — OFFICE VISIT (OUTPATIENT)
Dept: NEUROSURGERY | Facility: CLINIC | Age: 58
End: 2018-02-27
Payer: COMMERCIAL

## 2018-02-27 VITALS
SYSTOLIC BLOOD PRESSURE: 146 MMHG | OXYGEN SATURATION: 94 % | HEIGHT: 70 IN | HEART RATE: 71 BPM | WEIGHT: 269.8 LBS | DIASTOLIC BLOOD PRESSURE: 89 MMHG | TEMPERATURE: 98.6 F | RESPIRATION RATE: 16 BRPM | BODY MASS INDEX: 38.63 KG/M2

## 2018-02-27 DIAGNOSIS — Z98.1 S/P CERVICAL SPINAL FUSION: ICD-10-CM

## 2018-02-27 PROCEDURE — 72040 X-RAY EXAM NECK SPINE 2-3 VW: CPT

## 2018-02-27 PROCEDURE — 99024 POSTOP FOLLOW-UP VISIT: CPT | Performed by: NURSE PRACTITIONER

## 2018-02-27 RX ORDER — HYDROCODONE BITARTRATE AND ACETAMINOPHEN 5; 325 MG/1; MG/1
1 TABLET ORAL EVERY 6 HOURS PRN
Qty: 50 TABLET | Refills: 0 | Status: SHIPPED | OUTPATIENT
Start: 2018-02-27

## 2018-02-27 ASSESSMENT — PAIN SCALES - GENERAL: PAINLEVEL: MODERATE PAIN (5)

## 2018-02-27 NOTE — PROGRESS NOTES
[facility]  Neurosurgery followup:    HPI: Tony Aschoff is a 57 year old with a history of cervical myelopathy.  He underwent C5-6 discectomy and fusion with Dr. Boo nicole on 12/5/17. He states the severe pain prior to surgery continues to be gone and he has a dull pain to the left upper back/shoulder region.  He also has continued improvement with gait and balance.  He denies weakness to BUE.  He denies falls. He denies change of bowel or bladder. He returned to work PT and states it is going well, however, he notes on days when he is active the pain tends to increase by night time.  He states his employer is flexible and accommodating for him.  He would like to continue a 20 pound weight restriction which was given previously.    Exam:  Constitutional:  Alert, well nourished, NAD.  HEENT: Normocephalic, atraumatic.   Pulm:  Without shortness of breath   CV:  No pitting edema of BLE.     Neurological:  Awake  Alert  Oriented x 3  Motor exam:     Shoulder Abduction:  Right:  5/5    Left:  5/5  Biceps:                      Right:  5/5    Left:  5/5  Triceps:                     Right:  5/5    Left:  5/5  Wrist Extensors:       Right:  5/5    Left:  5/5  Wrist Flexors:           Right:  5/5    Left:  5/5  Intrinsics:                  Right:  5/5    Left:  5/5     Gait steady, non-antalgic, non-myelopathic  Incision: Clean, dry and intact  Imaging:  Per Xray fusion intact and stable    A/P: Tony Aschoff is a 57 year old with a history of cervical myelopathy.  He underwent C5-6 discectomy and fusion with Dr. Boo nicole on 12/5/17. He states the severe pain prior to surgery continues to be gone and he has a dull pain to the left upper back/shoulder region.  He also has continued improvement with gait and balance.  He denies weakness to BUE.  He denies falls. He denies change of bowel or bladder. He returned to work PT and states it is going well, however, he notes on days when he is active the  pain tends to increase by night time.  He states his employer is flexible and accommodating for him.  He would like to continue a 20 pound weight restriction which was given previously.  He was given one last prescription for Norco for prn, he states he takes it at night before bed generally.  He will f/u in 3 months or contact us if anything changes or any new concerns.      Patient Instructions   -May ease into normal activities  -Follow up in 3 months with Xrays prior  -Please contact the clinic if pain persists at [phone].          Abeba Hamilton [facility]  Spine and Brain Clinic  [address] [address]    Tel [phone]  Pager [phone]

## 2018-02-27 NOTE — MR AVS SNAPSHOT
"              After Visit Summary   2/27/2018    Tony S Aschoff    MRN: [id number]           Patient Information     Date Of Birth          1960        Visit Information        Provider Department      2/27/2018 2:00 PM Abeba Hamilton NP [facility]        Today's Diagnoses     S/P cervical spinal fusion          Care Instructions    -May ease into normal activities  -Follow up in 3 months with Xrays prior  -Please contact the clinic if pain persists at [phone].            Follow-ups after your visit        Future tests that were ordered for you today     Open Future Orders        Priority Expected Expires Ordered    XR Cervical Spine 2/3 Views Routine 5/27/2018 2/27/2019 2/27/2018            Who to contact     If you have questions or need follow up information about today's clinic visit or your schedule please contact [facility] directly at [phone].  Normal or non-critical lab and imaging results will be communicated to you by MyChart, letter or phone within 4 business days after the clinic has received the results. If you do not hear from us within 7 days, please contact the clinic through Matchmovehart or phone. If you have a critical or abnormal lab result, we will notify you by phone as soon as possible.  Submit refill requests through Streak or call your pharmacy and they will forward the refill request to us. Please allow 3 business days for your refill to be completed.          Additional Information About Your Visit        MyChart Information     Streak lets you send messages to your doctor, view your test results, renew your prescriptions, schedule appointments and more. To sign up, go to [email or web address] . Click on \"Log in\" on the left side of the screen, which will take you to the Welcome page. Then click on \"Sign up Now\" on the right side of the page.     You will be asked to enter the access code listed below, as well as some personal information. " "Please follow the directions to create your username and password.     Your access code is: [email or web address]  Expires: 2018  2:03 PM     Your access code will  in 90 days. If you need help or a new code, please call your [place] clinic or [phone].        Care EveryWhere ID     This is your Care EveryWhere ID. This could be used by other organizations to access your [place] medical records  [id number]        Your Vitals Were     Pulse Temperature Respirations Height Pulse Oximetry BMI (Body Mass Index)    71 98.6  F (37  C) (Oral) 16 5' 10\" (1.778 m) 94% 38.71 kg/m2       Blood Pressure from Last 3 Encounters:   18 146/89   18 125/81   17 122/66    Weight from Last 3 Encounters:   18 269 lb 12.8 oz (122.4 kg)   18 262 lb 9.6 oz (119.1 kg)   17 271 lb 12.8 oz (123.3 kg)                 Where to get your medicines      Some of these will need a paper prescription and others can be bought over the counter.  Ask your nurse if you have questions.     Bring a paper prescription for each of these medications     HYDROcodone-acetaminophen 5-325 MG per tablet          Primary Care Provider Office Phone # Fax #    [facility] [phone]       [address] [facility] [address]        Equal Access to Services     MICHELET TAYLOR : Hadii marcus serna hadasho Soharshilali, waaxda luqadaha, qaybta kaalmada norma, waxlyn jovan smith. So [facility] [phone].    ATENCIÓN: Si habla español, tiene a fernández disposición servicios gratuitos de asistencia lingüística. Llame al [phone].    We comply with applicable federal civil rights laws and [place] laws. We do not discriminate on the basis of race, color, national origin, age, disability, sex, sexual orientation, or gender identity.            Thank you!     Thank you for choosing [facility]  for your care. Our goal is always to provide you with excellent care. " Hearing back from our patients is one way we can continue to improve our services. Please take a few minutes to complete the written survey that you may receive in the mail after your visit with us. Thank you!             Your Updated Medication List - Protect others around you: Learn how to safely use, store and throw away your medicines at www.disposemymeds.org.          This list is accurate as of 2/27/18  2:03 PM.  Always use your most recent med list.                   Brand Name Dispense Instructions for use Diagnosis    ATORVASTATIN CALCIUM PO      Take 80 mg by mouth daily        BACLOFEN PO      Take 10 mg by mouth 3 times daily        BASAGLAR 100 UNIT/ML injection      Inject 50 Units Subcutaneous 2 times daily        citalopram 10 MG/5ML solution    celeXA     Take 20 mg by mouth daily        dulaglutide 1.5 MG/0.5ML pen    TRULICITY     Inject 1.5 mg Subcutaneous every 7 days        HYDROcodone-acetaminophen 5-325 MG per tablet    NORCO    50 tablet    Take 1 tablet by mouth every 6 hours as needed for moderate to severe pain (Max of 2 tablets daily prn.)    S/P cervical spinal fusion       LISINOPRIL PO      Take 10 mg by mouth daily        METFORMIN HCL PO      Take 500 mg by mouth 2 times daily (with meals) Take 2 tablets twice daily        NovoLOG FLEXPEN 100 UNIT/ML injection   Generic drug:  insulin aspart      Inject 30 Units Subcutaneous 3 times daily (with meals)

## 2018-02-27 NOTE — PATIENT INSTRUCTIONS
-May ease into normal activities  -Follow up in 3 months with Xrays prior  -Please contact the clinic if pain persists at [phone].

## 2018-02-27 NOTE — NURSING NOTE
"Tony S Aschoff is a 57 year old male who presents for:  Chief Complaint   Patient presents with     Surgical Followup     S/p C5-6 ACDF. DOS 12/5/17. Xray today. Patient states his neck has its moments. It has been pretty sore and stiff the last few days. He has been ice fishing. Since he went back to work it has been an off and on arriaza. 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         Initial Vitals:  /89  Pulse 71  Temp 98.6  F (37  C) (Oral)  Resp 16  Ht 5' 10\" (1.778 m)  Wt 269 lb 12.8 oz (122.4 kg)  SpO2 94%  BMI 38.71 kg/m2 Estimated body mass index is 38.71 kg/(m^2) as calculated from the following:    Height as of this encounter: 5' 10\" (1.778 m).    Weight as of this encounter: 269 lb 12.8 oz (122.4 kg).. Body surface area is 2.46 meters squared. BP completed using cuff size: regular  Moderate Pain (5)    Do you feel safe in your environment?  Yes  Do you need any refills today? No    Nursing Comments: S/p C5-6 ACDF. DOS 12/5/17. Xray today. Patient states his neck has its moments. It has been pretty sore and stiff the last few days. He has been ice fishing. Since he went back to work it has been an off and on arriaza. 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 Left side of neck is worse, goes into the left shoulder.         Ashley Stuart"

## 2018-03-28 ENCOUNTER — TELEPHONE (OUTPATIENT)
Dept: NEUROSURGERY | Facility: CLINIC | Age: 58
End: 2018-03-28

## 2018-03-28 NOTE — TELEPHONE ENCOUNTER
Reason for Call:  Other call back    Detailed comments: Patient calling stating wanting to speak about some medical issues. Please call back.     Phone Number Patient can be reached at: Cell number on file:    Telephone Information:   Mobile [phone]       Best Time: any     Can we leave a detailed message on this number? YES    Call taken on 3/28/2018 at 3:33 PM by Connor Brock

## 2018-04-10 NOTE — TELEPHONE ENCOUNTER
Spoke to patient via phone.  He has concerns regarding pain management.  He underwent surgery in December of last year with Dr. Butterfield ([facility]).  His neck symptoms are improved however he continues to have some shoulder pain.  His primary concern at this time is low back pain.  His PCP did refer him to [facility] for evaluation.  Reggie is not interested in returning to [facility] for evaluation as he feels that they did not address his concerns and did not offer to order any further pain medication.  Last Rx we provided for the patient post surgery was written at the end of February.  Patient understands that we are no longer providing pain medication at this point in his recovery post surgery.  I have offered referral to [facility] at this time as an alternative and Reggie declined at this time.  He is interested in pursuing smoking Marijuana as an option.  He understands that we discourage this.      I will review call with Abeba Hamilton NP.  Patient to follow up as previously directed.  Patient instructed to call if he wishes to pursue a referral for pain management. He has no further questions at this time.

## 2018-04-10 NOTE — TELEPHONE ENCOUNTER
REASON FOR CALL:  Pt states he would like a call back to discuss a personal issue that he is having.     Detailed message can be left:  YES

## 2021-07-25 ENCOUNTER — NURSE TRIAGE (OUTPATIENT)
Dept: NURSING | Facility: CLINIC | Age: 61
End: 2021-07-25

## 2021-07-26 NOTE — TELEPHONE ENCOUNTER
Pt states he is seen at  Pain Clinic. Not seeing any notes from pain clinic in EMR. Last [facility] contact in EMR is May 2018. He only has one hydrocodone tab left. Says he did not receive the full quantity prescribed by pain clinic doctor - pharmacy would only give him 14 tabs for insurance reasons. Pain 7/10 in neck.  Disc'd we have no on-call for pain clinic Advised if he finds pain intolerable the ED would be only option on Sun night. Rec call doctor tomorrow AM; call insurance co also.       Reason for Disposition    [1] Caller requesting a NON-URGENT new prescription or refill AND [2] triager unable to refill per unit policy    Additional Information    Negative: Drug overdose and triager unable to answer question    Negative: Caller requesting information unrelated to medicine    Negative: Caller requesting a prescription for Strep throat and has a positive culture result    Negative: Rash while taking a medication or within 3 days of stopping it    Negative: Immunization reaction suspected    Negative: [1] Asthma and [2] having symptoms of asthma (cough, wheezing, etc.)    Negative: [1] Influenza symptoms AND [2] anti-viral med prescription request, such as Tamiflu    Negative: [1] Symptom of illness (e.g., headache, abdominal pain, earache, vomiting) AND [2] more than mild    Negative: MORE THAN A DOUBLE DOSE of a prescription or over-the-counter (OTC) drug    Negative: [1] DOUBLE DOSE (an extra dose or lesser amount) of over-the-counter (OTC) drug AND [2] any symptoms (e.g., dizziness, nausea, pain, sleepiness)    Negative: [1] DOUBLE DOSE (an extra dose or lesser amount) of prescription drug AND [2] any symptoms (e.g., dizziness, nausea, pain, sleepiness)    Negative: Took another person's prescription drug    Negative: [1] DOUBLE DOSE (an extra dose or lesser amount) of prescription drug AND [2] NO symptoms (Exception: a double dose of antibiotics)    Negative: Diabetes drug error or overdose (e.g., took  "wrong type of insulin or took extra dose)    Negative: [1] Request for URGENT new prescription or refill of \"essential\" medication (i.e., likelihood of harm to patient if not taken) AND [2] triager unable to fill per unit policy    Negative: [1] Prescription not at pharmacy AND [2] was prescribed by PCP recently    Negative: [1] Pharmacy calling with prescription questions AND [2] triager unable to answer question    Negative: [1] Caller has URGENT medication question about med that PCP or specialist prescribed AND [2] triager unable to answer question    Negative: [1] Caller has NON-URGENT medication question about med that PCP prescribed AND [2] triager unable to answer question    Protocols used: MEDICATION QUESTION CALL-A-AH      "

## 2022-01-01 NOTE — IP AVS SNAPSHOT
[facility]    [address]    [facility] [address]    Phone:  [phone]    Fax:  [phone]                                       After Visit Summary   12/5/2017    Tony S Aschoff    MRN: [id number]           After Visit Summary Signature Page     I have received my discharge instructions, and my questions have been answered. I have discussed any challenges I see with this plan with the nurse or doctor.    ..........................................................................................................................................  Patient/Patient Representative Signature      ..........................................................................................................................................  Patient Representative Print Name and Relationship to Patient    ..................................................               ................................................  Date                                            Time    ..........................................................................................................................................  Reviewed by Signature/Title    ...................................................              ..............................................  Date                                                            Time           unknown

## 2022-01-25 ENCOUNTER — LAB REQUISITION (OUTPATIENT)
Dept: LAB | Facility: CLINIC | Age: 62
End: 2022-01-25

## 2022-01-25 PROCEDURE — 88305 TISSUE EXAM BY PATHOLOGIST: CPT | Performed by: PATHOLOGY

## 2022-01-27 LAB
PATH REPORT.COMMENTS IMP SPEC: NORMAL
PATH REPORT.COMMENTS IMP SPEC: NORMAL
PATH REPORT.FINAL DX SPEC: NORMAL
PATH REPORT.GROSS SPEC: NORMAL
PATH REPORT.MICROSCOPIC SPEC OTHER STN: NORMAL
PATH REPORT.RELEVANT HX SPEC: NORMAL
PHOTO IMAGE: NORMAL

## 2023-03-23 NOTE — PATIENT INSTRUCTIONS
Schedule return with Dr. Butterfield to discuss surgical option.  Please contact the clinic if pain persists at [phone].    
emesis

## (undated) DEVICE — DECANTER VIAL 2006S

## (undated) DEVICE — DRSG KERLIX FLUFFS X5

## (undated) DEVICE — TUBING SUCTION MEDI-VAC SOFT 3/16"X20' N520A

## (undated) DEVICE — ESU ELEC BLADE 2.75" COATED/INSULATED E1455

## (undated) DEVICE — DRAPE X-RAY TUBE 00-901169-01-OEC

## (undated) DEVICE — ESU GROUND PAD ADULT W/CORD E7507

## (undated) DEVICE — SU VICRYL 2-0 CT-2 CR 8X18" J726D

## (undated) DEVICE — NDL SPINAL 22GA 3.5" QUINCKE 405181

## (undated) DEVICE — BLADE KNIFE SURG 11 371111

## (undated) DEVICE — GLOVE PROTEXIS W/NEU-THERA 8.5  2D73TE85

## (undated) DEVICE — RX SURGIFLO HEMOSTATIC MATRIX 8ML 2991

## (undated) DEVICE — LINEN ORTHO ACL PACK 5447

## (undated) DEVICE — DRAPE MAYO STAND 23X54 8337

## (undated) DEVICE — PEN MARKING SKIN W/LABELS 31145884

## (undated) DEVICE — PACK SMALL SPINE RIDGES

## (undated) DEVICE — SPONGE SURGIFOAM 100 1974

## (undated) DEVICE — DRSG TELFA ISLAND 4X10"

## (undated) DEVICE — LINEN POUCH DBL 5427

## (undated) DEVICE — DRAIN JACKSON PRATT RESERVOIR 100ML SU130-1305

## (undated) DEVICE — GLOVE PROTEXIS BLUE W/NEU-THERA 8.5  2D73EB85

## (undated) DEVICE — SPONGE COTTONOID 1/2X1/2" 80-1400

## (undated) DEVICE — GLOVE PROTEXIS W/NEU-THERA 7.5  2D73TE75

## (undated) DEVICE — TAPE DURAPORE 3" SILK 1538-3

## (undated) DEVICE — DECANTER BAG 2002S

## (undated) DEVICE — SUCTION MANIFOLD NEPTUNE 2 SYS 4 PORT 0702-020-000

## (undated) DEVICE — DRAIN JACKSON PRATT CHANNEL 10FR RND SIL W/TROCAR JP-2227

## (undated) DEVICE — DRAPE MICROSCOPE OPMI ZEISS 48X118" 306071-0000-000

## (undated) DEVICE — DRAPE IOBAN INCISE 23X17" 6650EZ

## (undated) DEVICE — Device

## (undated) DEVICE — MIDAS REX DISSECTING TOOL  14MH30

## (undated) DEVICE — ESU CLEANER TIP 31142717

## (undated) DEVICE — SU VICRYL 3-0 SH CR 8X18" J774

## (undated) DEVICE — GOWN REINFORCED XXLG 9071

## (undated) DEVICE — IMM COLLAR CERVICAL MED UNIVERSAL 3X24" 79-83500

## (undated) DEVICE — PREP DURAPREP 26ML APL 8630

## (undated) RX ORDER — EPHEDRINE SULFATE 50 MG/ML
INJECTION, SOLUTION INTRAMUSCULAR; INTRAVENOUS; SUBCUTANEOUS
Status: DISPENSED
Start: 2017-12-05

## (undated) RX ORDER — NEOSTIGMINE METHYLSULFATE 1 MG/ML
VIAL (ML) INJECTION
Status: DISPENSED
Start: 2017-12-05

## (undated) RX ORDER — DEXAMETHASONE SODIUM PHOSPHATE 4 MG/ML
INJECTION, SOLUTION INTRA-ARTICULAR; INTRALESIONAL; INTRAMUSCULAR; INTRAVENOUS; SOFT TISSUE
Status: DISPENSED
Start: 2017-12-05

## (undated) RX ORDER — LIDOCAINE HYDROCHLORIDE 10 MG/ML
INJECTION, SOLUTION EPIDURAL; INFILTRATION; INTRACAUDAL; PERINEURAL
Status: DISPENSED
Start: 2017-12-05

## (undated) RX ORDER — BUPIVACAINE HYDROCHLORIDE AND EPINEPHRINE 5; 5 MG/ML; UG/ML
INJECTION, SOLUTION EPIDURAL; INTRACAUDAL; PERINEURAL
Status: DISPENSED
Start: 2017-12-05

## (undated) RX ORDER — PROPOFOL 10 MG/ML
INJECTION, EMULSION INTRAVENOUS
Status: DISPENSED
Start: 2017-12-05

## (undated) RX ORDER — ONDANSETRON 2 MG/ML
INJECTION INTRAMUSCULAR; INTRAVENOUS
Status: DISPENSED
Start: 2017-12-05

## (undated) RX ORDER — KETOROLAC TROMETHAMINE 30 MG/ML
INJECTION, SOLUTION INTRAMUSCULAR; INTRAVENOUS
Status: DISPENSED
Start: 2017-12-05

## (undated) RX ORDER — GLYCOPYRROLATE 0.2 MG/ML
INJECTION INTRAMUSCULAR; INTRAVENOUS
Status: DISPENSED
Start: 2017-12-05

## (undated) RX ORDER — FENTANYL CITRATE 50 UG/ML
INJECTION, SOLUTION INTRAMUSCULAR; INTRAVENOUS
Status: DISPENSED
Start: 2017-12-05

## (undated) RX ORDER — CEFAZOLIN SODIUM 1 G/50ML
SOLUTION INTRAVENOUS
Status: DISPENSED
Start: 2017-12-05

## (undated) RX ORDER — HYDROMORPHONE HYDROCHLORIDE 1 MG/ML
INJECTION, SOLUTION INTRAMUSCULAR; INTRAVENOUS; SUBCUTANEOUS
Status: DISPENSED
Start: 2017-12-05